# Patient Record
Sex: FEMALE | Race: BLACK OR AFRICAN AMERICAN | NOT HISPANIC OR LATINO | Employment: UNEMPLOYED | URBAN - METROPOLITAN AREA
[De-identification: names, ages, dates, MRNs, and addresses within clinical notes are randomized per-mention and may not be internally consistent; named-entity substitution may affect disease eponyms.]

---

## 2017-04-13 ENCOUNTER — HISTORICAL (OUTPATIENT)
Dept: ADMINISTRATIVE | Facility: HOSPITAL | Age: 62
End: 2017-04-13

## 2017-04-18 ENCOUNTER — HISTORICAL (OUTPATIENT)
Dept: ADMINISTRATIVE | Facility: HOSPITAL | Age: 62
End: 2017-04-18

## 2017-04-28 ENCOUNTER — HISTORICAL (OUTPATIENT)
Dept: ADMINISTRATIVE | Facility: HOSPITAL | Age: 62
End: 2017-04-28

## 2017-04-30 ENCOUNTER — HISTORICAL (OUTPATIENT)
Dept: ADMINISTRATIVE | Facility: HOSPITAL | Age: 62
End: 2017-04-30

## 2017-07-06 ENCOUNTER — HISTORICAL (OUTPATIENT)
Dept: ADMINISTRATIVE | Facility: HOSPITAL | Age: 62
End: 2017-07-06

## 2017-07-06 LAB — TSH SERPL-ACNC: 1.62 MIU/ML (ref 0.36–3.74)

## 2017-10-23 ENCOUNTER — HISTORICAL (OUTPATIENT)
Dept: ADMINISTRATIVE | Facility: HOSPITAL | Age: 62
End: 2017-10-23

## 2017-10-23 LAB
ABS NEUT (OLG): 2.54 X10(3)/MCL (ref 2.1–9.2)
ALBUMIN SERPL-MCNC: 2.8 GM/DL (ref 3.4–5)
ALBUMIN/GLOB SERPL: 0.8 RATIO (ref 1.1–2)
ALP SERPL-CCNC: 77 UNIT/L (ref 38–126)
ALT SERPL-CCNC: 14 UNIT/L (ref 12–78)
AST SERPL-CCNC: 14 UNIT/L (ref 15–37)
BASOPHILS # BLD AUTO: 0 X10(3)/MCL (ref 0–0.2)
BASOPHILS NFR BLD AUTO: 1 %
BILIRUB SERPL-MCNC: 0.3 MG/DL (ref 0.2–1)
BILIRUBIN DIRECT+TOT PNL SERPL-MCNC: 0.1 MG/DL (ref 0–0.5)
BILIRUBIN DIRECT+TOT PNL SERPL-MCNC: 0.2 MG/DL (ref 0–0.8)
BUN SERPL-MCNC: 15 MG/DL (ref 7–18)
CALCIUM SERPL-MCNC: 8.8 MG/DL (ref 8.5–10.1)
CHLORIDE SERPL-SCNC: 108 MMOL/L (ref 98–107)
CO2 SERPL-SCNC: 31 MMOL/L (ref 21–32)
CREAT SERPL-MCNC: 0.49 MG/DL (ref 0.55–1.02)
EOSINOPHIL # BLD AUTO: 0.5 X10(3)/MCL (ref 0–0.9)
EOSINOPHIL NFR BLD AUTO: 9 %
ERYTHROCYTE [DISTWIDTH] IN BLOOD BY AUTOMATED COUNT: 13.9 % (ref 11.5–17)
GLOBULIN SER-MCNC: 3.5 GM/DL (ref 2.4–3.5)
GLUCOSE SERPL-MCNC: 81 MG/DL (ref 74–106)
HCT VFR BLD AUTO: 35.3 % (ref 37–47)
HGB BLD-MCNC: 11.3 GM/DL (ref 12–16)
LYMPHOCYTES # BLD AUTO: 2 X10(3)/MCL (ref 0.6–4.6)
LYMPHOCYTES NFR BLD AUTO: 36 %
MCH RBC QN AUTO: 26 PG (ref 27–31)
MCHC RBC AUTO-ENTMCNC: 32 GM/DL (ref 33–36)
MCV RBC AUTO: 81.3 FL (ref 80–94)
MONOCYTES # BLD AUTO: 0.5 X10(3)/MCL (ref 0.1–1.3)
MONOCYTES NFR BLD AUTO: 9 %
NEUTROPHILS # BLD AUTO: 2.54 X10(3)/MCL (ref 1.4–7.9)
NEUTROPHILS NFR BLD AUTO: 45 %
PLATELET # BLD AUTO: 157 X10(3)/MCL (ref 130–400)
PMV BLD AUTO: 11.7 FL (ref 9.4–12.4)
POTASSIUM SERPL-SCNC: 4.2 MMOL/L (ref 3.5–5.1)
PROT SERPL-MCNC: 6.3 GM/DL (ref 6.4–8.2)
RBC # BLD AUTO: 4.34 X10(6)/MCL (ref 4.2–5.4)
SODIUM SERPL-SCNC: 144 MMOL/L (ref 136–145)
WBC # SPEC AUTO: 5.6 X10(3)/MCL (ref 4.5–11.5)

## 2017-10-30 ENCOUNTER — HISTORICAL (OUTPATIENT)
Dept: ADMINISTRATIVE | Facility: HOSPITAL | Age: 62
End: 2017-10-30

## 2017-10-30 LAB — PREALB SERPL-MCNC: 19.9 MG/DL (ref 18–38)

## 2018-02-14 ENCOUNTER — HISTORICAL (OUTPATIENT)
Dept: ADMINISTRATIVE | Facility: HOSPITAL | Age: 63
End: 2018-02-14

## 2018-02-14 LAB
APPEARANCE, UA: ABNORMAL
BACTERIA SPEC CULT: ABNORMAL /HPF
BILIRUB UR QL STRIP: NEGATIVE
COLOR UR: YELLOW
GLUCOSE (UA): NEGATIVE
HGB UR QL STRIP: ABNORMAL
KETONES UR QL STRIP: NEGATIVE
LEUKOCYTE ESTERASE UR QL STRIP: ABNORMAL
NITRITE UR QL STRIP: NEGATIVE
PH UR STRIP: 8 [PH] (ref 5–9)
PROT UR QL STRIP: ABNORMAL
RBC #/AREA URNS HPF: >900 /HPF (ref 0–2)
SP GR UR STRIP: 1.01 (ref 1–1.03)
SQUAMOUS EPITHELIAL, UA: ABNORMAL
UA WBC MAN: ABNORMAL /HPF
UROBILINOGEN UR STRIP-ACNC: 0.2
WBC #/AREA URNS HPF: 47 /HPF (ref 0–3)

## 2018-02-15 LAB — FINAL CULTURE: NORMAL

## 2018-03-01 ENCOUNTER — HISTORICAL (OUTPATIENT)
Dept: ADMINISTRATIVE | Facility: HOSPITAL | Age: 63
End: 2018-03-01

## 2018-03-01 LAB
APPEARANCE, UA: ABNORMAL
BACTERIA SPEC CULT: ABNORMAL /HPF
BILIRUB UR QL STRIP: NEGATIVE
COLOR UR: YELLOW
GLUCOSE (UA): NEGATIVE
HGB UR QL STRIP: ABNORMAL
KETONES UR QL STRIP: NEGATIVE
LEUKOCYTE ESTERASE UR QL STRIP: ABNORMAL
NITRITE UR QL STRIP: NEGATIVE
PH UR STRIP: 7 [PH] (ref 5–9)
PROT UR QL STRIP: NEGATIVE
RBC #/AREA URNS HPF: >200 /HPF
SP GR UR STRIP: 1.01 (ref 1–1.03)
SQUAMOUS EPITHELIAL, UA: ABNORMAL
UA WBC MAN: ABNORMAL /HPF
UROBILINOGEN UR STRIP-ACNC: 0.2

## 2018-03-03 LAB — FINAL CULTURE: NORMAL

## 2018-04-09 ENCOUNTER — HISTORICAL (OUTPATIENT)
Dept: ADMINISTRATIVE | Facility: HOSPITAL | Age: 63
End: 2018-04-09

## 2018-04-09 LAB
HEMOCCULT SP1 STL QL: NEGATIVE
HEMOCCULT SP2 STL QL: NEGATIVE

## 2018-04-10 LAB
COLOR STL: NORMAL
CONSISTENCY STL: NORMAL
HEMOCCULT SP1 STL QL: NEGATIVE

## 2018-04-11 ENCOUNTER — HISTORICAL (OUTPATIENT)
Dept: ADMINISTRATIVE | Facility: HOSPITAL | Age: 63
End: 2018-04-11

## 2018-04-19 ENCOUNTER — HISTORICAL (OUTPATIENT)
Dept: ADMINISTRATIVE | Facility: HOSPITAL | Age: 63
End: 2018-04-19

## 2018-04-19 LAB
ABS NEUT (OLG): 2.37 X10(3)/MCL (ref 2.1–9.2)
ALBUMIN SERPL-MCNC: 3.1 GM/DL (ref 3.4–5)
ALBUMIN/GLOB SERPL: 1 {RATIO}
ALP SERPL-CCNC: 64 UNIT/L (ref 38–126)
ALT SERPL-CCNC: 14 UNIT/L (ref 12–78)
AST SERPL-CCNC: 13 UNIT/L (ref 15–37)
BASOPHILS # BLD AUTO: 0 X10(3)/MCL (ref 0–0.2)
BASOPHILS NFR BLD AUTO: 1 %
BILIRUB SERPL-MCNC: 0.3 MG/DL (ref 0.2–1)
BILIRUBIN DIRECT+TOT PNL SERPL-MCNC: 0.1 MG/DL (ref 0–0.2)
BILIRUBIN DIRECT+TOT PNL SERPL-MCNC: 0.2 MG/DL (ref 0–0.8)
BUN SERPL-MCNC: 22 MG/DL (ref 7–18)
CALCIUM SERPL-MCNC: 9.1 MG/DL (ref 8.5–10.1)
CHLORIDE SERPL-SCNC: 112 MMOL/L (ref 98–107)
CO2 SERPL-SCNC: 30 MMOL/L (ref 21–32)
CREAT SERPL-MCNC: 0.45 MG/DL (ref 0.55–1.02)
EOSINOPHIL # BLD AUTO: 0.6 X10(3)/MCL (ref 0–0.9)
EOSINOPHIL NFR BLD AUTO: 11 %
ERYTHROCYTE [DISTWIDTH] IN BLOOD BY AUTOMATED COUNT: 14.4 % (ref 11.5–17)
GLOBULIN SER-MCNC: 3 GM/DL (ref 2.4–3.5)
GLUCOSE SERPL-MCNC: 81 MG/DL (ref 74–106)
HCT VFR BLD AUTO: 33.2 % (ref 37–47)
HGB BLD-MCNC: 10.4 GM/DL (ref 12–16)
LYMPHOCYTES # BLD AUTO: 1.8 X10(3)/MCL (ref 0.6–4.6)
LYMPHOCYTES NFR BLD AUTO: 34 %
MCH RBC QN AUTO: 25.6 PG (ref 27–31)
MCHC RBC AUTO-ENTMCNC: 31.3 GM/DL (ref 33–36)
MCV RBC AUTO: 81.6 FL (ref 80–94)
MONOCYTES # BLD AUTO: 0.5 X10(3)/MCL (ref 0.1–1.3)
MONOCYTES NFR BLD AUTO: 9 %
NEUTROPHILS # BLD AUTO: 2.37 X10(3)/MCL (ref 2.1–9.2)
NEUTROPHILS NFR BLD AUTO: 45 %
PLATELET # BLD AUTO: 183 X10(3)/MCL (ref 130–400)
PMV BLD AUTO: 12.2 FL (ref 9.4–12.4)
POTASSIUM SERPL-SCNC: 4 MMOL/L (ref 3.5–5.1)
PROT SERPL-MCNC: 6.1 GM/DL (ref 6.4–8.2)
RBC # BLD AUTO: 4.07 X10(6)/MCL (ref 4.2–5.4)
SODIUM SERPL-SCNC: 146 MMOL/L (ref 136–145)
WBC # SPEC AUTO: 5.3 X10(3)/MCL (ref 4.5–11.5)

## 2018-07-09 ENCOUNTER — HISTORICAL (OUTPATIENT)
Dept: ADMINISTRATIVE | Facility: HOSPITAL | Age: 63
End: 2018-07-09

## 2018-07-09 LAB — TSH SERPL-ACNC: 2.47 MIU/L (ref 0.36–3.74)

## 2018-10-22 ENCOUNTER — HISTORICAL (OUTPATIENT)
Dept: ADMINISTRATIVE | Facility: HOSPITAL | Age: 63
End: 2018-10-22

## 2018-10-22 LAB
ABS NEUT (OLG): 2.86 X10(3)/MCL (ref 2.1–9.2)
ALBUMIN SERPL-MCNC: 3.2 GM/DL (ref 3.4–5)
ALBUMIN/GLOB SERPL: 1 {RATIO}
ALP SERPL-CCNC: 67 UNIT/L (ref 45–117)
ALT SERPL-CCNC: 14 UNIT/L (ref 13–56)
AST SERPL-CCNC: 14 UNIT/L (ref 15–37)
BASOPHILS # BLD AUTO: 0.03 X10(3)/MCL (ref 0–0.2)
BASOPHILS NFR BLD AUTO: 0.5 % (ref 0–1)
BILIRUB SERPL-MCNC: 0.4 MG/DL (ref 0.2–1)
BILIRUBIN DIRECT+TOT PNL SERPL-MCNC: <0.1 MG/DL (ref 0–0.2)
BILIRUBIN DIRECT+TOT PNL SERPL-MCNC: >0.3 MG/DL (ref 0–1)
BUN SERPL-MCNC: 23 MG/DL (ref 7–18)
CALCIUM SERPL-MCNC: 8.8 MG/DL (ref 8.5–10.1)
CHLORIDE SERPL-SCNC: 107 MMOL/L (ref 98–107)
CO2 SERPL-SCNC: 31 MMOL/L (ref 21–32)
CREAT SERPL-MCNC: 0.5 MG/DL (ref 0.55–1.02)
EOSINOPHIL # BLD AUTO: 0.51 X10(3)/MCL (ref 0–0.9)
EOSINOPHIL NFR BLD AUTO: 8.8 % (ref 0–6.4)
ERYTHROCYTE [DISTWIDTH] IN BLOOD BY AUTOMATED COUNT: 13.3 % (ref 11.5–17)
GLOBULIN SER-MCNC: 3 GM/DL (ref 2–4)
GLUCOSE SERPL-MCNC: 84 MG/DL (ref 74–106)
HCT VFR BLD AUTO: 33.5 % (ref 37–47)
HGB BLD-MCNC: 10.6 GM/DL (ref 12–16)
IMM GRANULOCYTES # BLD AUTO: 0.01 10*3/UL (ref 0–0.02)
IMM GRANULOCYTES NFR BLD AUTO: 0.2 % (ref 0–0.43)
LYMPHOCYTES # BLD AUTO: 1.9 X10(3)/MCL (ref 0.6–4.6)
LYMPHOCYTES NFR BLD AUTO: 32.7 % (ref 16–44)
MCH RBC QN AUTO: 26.2 PG (ref 27–31)
MCHC RBC AUTO-ENTMCNC: 31.6 GM/DL (ref 33–36)
MCV RBC AUTO: 82.7 FL (ref 80–94)
MONOCYTES # BLD AUTO: 0.5 X10(3)/MCL (ref 0.1–1.3)
MONOCYTES NFR BLD AUTO: 8.6 % (ref 4–12.1)
NEUTROPHILS # BLD AUTO: 2.86 X10(3)/MCL (ref 2.1–9.2)
NEUTROPHILS NFR BLD AUTO: 49.2 % (ref 43–73)
NRBC BLD AUTO-RTO: 0 % (ref 0–0.2)
PLATELET # BLD AUTO: 140 X10(3)/MCL (ref 130–400)
PMV BLD AUTO: 12.6 FL (ref 7.4–10.4)
POTASSIUM SERPL-SCNC: 4.2 MMOL/L (ref 3.5–5.1)
PROT SERPL-MCNC: 6.2 GM/DL (ref 6.4–8.2)
RBC # BLD AUTO: 4.05 X10(6)/MCL (ref 4.2–5.4)
SODIUM SERPL-SCNC: 144 MMOL/L (ref 136–145)
WBC # SPEC AUTO: 5.8 X10(3)/MCL (ref 4.5–11.5)

## 2019-04-03 ENCOUNTER — HISTORICAL (OUTPATIENT)
Dept: ADMINISTRATIVE | Facility: HOSPITAL | Age: 64
End: 2019-04-03

## 2019-04-03 LAB — HEMOCCULT SP1 STL QL: NEGATIVE

## 2019-04-14 LAB — HEMOCCULT SP2 STL QL: NEGATIVE

## 2019-04-15 ENCOUNTER — HISTORICAL (OUTPATIENT)
Dept: ADMINISTRATIVE | Facility: HOSPITAL | Age: 64
End: 2019-04-15

## 2019-04-18 ENCOUNTER — HISTORICAL (OUTPATIENT)
Dept: ADMINISTRATIVE | Facility: HOSPITAL | Age: 64
End: 2019-04-18

## 2019-04-18 LAB
ABS NEUT (OLG): 2.34 X10(3)/MCL (ref 2.1–9.2)
ALBUMIN SERPL-MCNC: 3.3 GM/DL (ref 3.4–5)
ALBUMIN/GLOB SERPL: 0.8 RATIO (ref 1–2)
ALP SERPL-CCNC: 64 UNIT/L (ref 45–117)
ALT SERPL-CCNC: 15 UNIT/L (ref 13–56)
AST SERPL-CCNC: 20 UNIT/L (ref 15–37)
BASOPHILS # BLD AUTO: 0.03 X10(3)/MCL (ref 0–0.2)
BASOPHILS NFR BLD AUTO: 0.6 % (ref 0–1)
BILIRUB SERPL-MCNC: 0.3 MG/DL (ref 0.2–1)
BILIRUBIN DIRECT+TOT PNL SERPL-MCNC: <0.1 MG/DL (ref 0–0.2)
BILIRUBIN DIRECT+TOT PNL SERPL-MCNC: >0.2 MG/DL (ref 0–1)
BUN SERPL-MCNC: 21 MG/DL (ref 7–18)
CALCIUM SERPL-MCNC: 9 MG/DL (ref 8.5–10.1)
CHLORIDE SERPL-SCNC: 106 MMOL/L (ref 98–107)
CO2 SERPL-SCNC: 29 MMOL/L (ref 21–32)
CREAT SERPL-MCNC: 0.68 MG/DL (ref 0.55–1.02)
EOSINOPHIL # BLD AUTO: 0.44 X10(3)/MCL (ref 0–0.9)
EOSINOPHIL NFR BLD AUTO: 9.3 % (ref 0–6.4)
ERYTHROCYTE [DISTWIDTH] IN BLOOD BY AUTOMATED COUNT: 13.2 % (ref 11.5–17)
GLOBULIN SER-MCNC: 4 GM/DL (ref 2–4)
GLUCOSE SERPL-MCNC: 73 MG/DL (ref 74–106)
HCT VFR BLD AUTO: 34.9 % (ref 37–47)
HGB BLD-MCNC: 11.1 GM/DL (ref 12–16)
IMM GRANULOCYTES # BLD AUTO: 0.01 10*3/UL (ref 0–0.02)
IMM GRANULOCYTES NFR BLD AUTO: 0.2 % (ref 0–0.43)
LYMPHOCYTES # BLD AUTO: 1.52 X10(3)/MCL (ref 0.6–4.6)
LYMPHOCYTES NFR BLD AUTO: 32.2 % (ref 16–44)
MCH RBC QN AUTO: 26.1 PG (ref 27–31)
MCHC RBC AUTO-ENTMCNC: 31.8 GM/DL (ref 33–36)
MCV RBC AUTO: 82.1 FL (ref 80–94)
MONOCYTES # BLD AUTO: 0.38 X10(3)/MCL (ref 0.1–1.3)
MONOCYTES NFR BLD AUTO: 8.1 % (ref 4–12.1)
NEUTROPHILS # BLD AUTO: 2.34 X10(3)/MCL (ref 2.1–9.2)
NEUTROPHILS NFR BLD AUTO: 49.6 % (ref 43–73)
NRBC BLD AUTO-RTO: 0 % (ref 0–0.2)
PLATELET # BLD AUTO: 155 X10(3)/MCL (ref 130–400)
PMV BLD AUTO: 12.6 FL (ref 7.4–10.4)
POTASSIUM SERPL-SCNC: 4 MMOL/L (ref 3.5–5.1)
PROT SERPL-MCNC: 6.8 GM/DL (ref 6.4–8.2)
RBC # BLD AUTO: 4.25 X10(6)/MCL (ref 4.2–5.4)
SODIUM SERPL-SCNC: 144 MMOL/L (ref 136–145)
WBC # SPEC AUTO: 4.7 X10(3)/MCL (ref 4.5–11.5)

## 2019-07-08 ENCOUNTER — HISTORICAL (OUTPATIENT)
Dept: ADMINISTRATIVE | Facility: HOSPITAL | Age: 64
End: 2019-07-08

## 2019-07-08 LAB — TSH SERPL-ACNC: 1.47 MIU/ML (ref 0.36–3.74)

## 2019-08-01 ENCOUNTER — HISTORICAL (OUTPATIENT)
Dept: ADMINISTRATIVE | Facility: HOSPITAL | Age: 64
End: 2019-08-01

## 2019-08-01 LAB
ABS NEUT (OLG): 4.56 X10(3)/MCL (ref 2.1–9.2)
ALBUMIN SERPL-MCNC: 2.8 GM/DL (ref 3.4–5)
ALBUMIN/GLOB SERPL: 0.7 {RATIO}
ALP SERPL-CCNC: 59 UNIT/L (ref 45–117)
ALT SERPL-CCNC: 22 UNIT/L (ref 13–56)
AST SERPL-CCNC: 28 UNIT/L (ref 15–37)
BASOPHILS # BLD AUTO: 0.04 X10(3)/MCL (ref 0–0.2)
BASOPHILS NFR BLD AUTO: 0.6 % (ref 0–1)
BILIRUB SERPL-MCNC: 0.5 MG/DL (ref 0.2–1)
BILIRUBIN DIRECT+TOT PNL SERPL-MCNC: 0.14 MG/DL (ref 0–0.2)
BILIRUBIN DIRECT+TOT PNL SERPL-MCNC: 0.36 MG/DL (ref 0–1)
BUN SERPL-MCNC: 16 MG/DL (ref 7–18)
CALCIUM SERPL-MCNC: 8.8 MG/DL (ref 8.5–10.1)
CHLORIDE SERPL-SCNC: 108 MMOL/L (ref 98–107)
CO2 SERPL-SCNC: 32 MMOL/L (ref 21–32)
CREAT SERPL-MCNC: 0.63 MG/DL (ref 0.55–1.02)
EOSINOPHIL # BLD AUTO: 0.23 X10(3)/MCL (ref 0–0.9)
EOSINOPHIL NFR BLD AUTO: 3.4 % (ref 0–6.4)
ERYTHROCYTE [DISTWIDTH] IN BLOOD BY AUTOMATED COUNT: 13.4 % (ref 11.5–17)
GLOBULIN SER-MCNC: 4 GM/DL (ref 2–4)
GLUCOSE SERPL-MCNC: 119 MG/DL (ref 74–106)
HCT VFR BLD AUTO: 29.9 % (ref 37–47)
HGB BLD-MCNC: 9.4 GM/DL (ref 12–16)
IMM GRANULOCYTES # BLD AUTO: 0.01 10*3/UL (ref 0–0.02)
IMM GRANULOCYTES NFR BLD AUTO: 0.1 % (ref 0–0.43)
LYMPHOCYTES # BLD AUTO: 1.4 X10(3)/MCL (ref 0.6–4.6)
LYMPHOCYTES NFR BLD AUTO: 20.7 % (ref 16–44)
MCH RBC QN AUTO: 25.8 PG (ref 27–31)
MCHC RBC AUTO-ENTMCNC: 31.4 GM/DL (ref 33–36)
MCV RBC AUTO: 82.1 FL (ref 80–94)
MONOCYTES # BLD AUTO: 0.52 X10(3)/MCL (ref 0.1–1.3)
MONOCYTES NFR BLD AUTO: 7.7 % (ref 4–12.1)
NEUTROPHILS # BLD AUTO: 4.56 X10(3)/MCL (ref 2.1–9.2)
NEUTROPHILS NFR BLD AUTO: 67.5 % (ref 43–73)
NRBC BLD AUTO-RTO: 0 % (ref 0–0.2)
PLATELET # BLD AUTO: 193 X10(3)/MCL (ref 130–400)
PMV BLD AUTO: 11.7 FL (ref 7.4–10.4)
POTASSIUM SERPL-SCNC: 3.6 MMOL/L (ref 3.5–5.1)
PROT SERPL-MCNC: 6.4 GM/DL (ref 6.4–8.2)
RBC # BLD AUTO: 3.64 X10(6)/MCL (ref 4.2–5.4)
SODIUM SERPL-SCNC: 143 MMOL/L (ref 136–145)
WBC # SPEC AUTO: 6.8 X10(3)/MCL (ref 4.5–11.5)

## 2019-08-08 ENCOUNTER — HISTORICAL (OUTPATIENT)
Dept: ADMINISTRATIVE | Facility: HOSPITAL | Age: 64
End: 2019-08-08

## 2019-08-08 LAB
ABS NEUT (OLG): 4.92 X10(3)/MCL (ref 2.1–9.2)
BASOPHILS # BLD AUTO: 0.02 X10(3)/MCL (ref 0–0.2)
BASOPHILS NFR BLD AUTO: 0.3 % (ref 0–1)
EOSINOPHIL # BLD AUTO: 0.41 X10(3)/MCL (ref 0–0.9)
EOSINOPHIL NFR BLD AUTO: 5.5 % (ref 0–6.4)
ERYTHROCYTE [DISTWIDTH] IN BLOOD BY AUTOMATED COUNT: 13.2 % (ref 11.5–17)
HCT VFR BLD AUTO: 29.7 % (ref 37–47)
HGB BLD-MCNC: 9.4 GM/DL (ref 12–16)
IMM GRANULOCYTES # BLD AUTO: 0.02 10*3/UL (ref 0–0.02)
IMM GRANULOCYTES NFR BLD AUTO: 0.3 % (ref 0–0.43)
LYMPHOCYTES # BLD AUTO: 1.42 X10(3)/MCL (ref 0.6–4.6)
LYMPHOCYTES NFR BLD AUTO: 19 % (ref 16–44)
MCH RBC QN AUTO: 25.8 PG (ref 27–31)
MCHC RBC AUTO-ENTMCNC: 31.6 GM/DL (ref 33–36)
MCV RBC AUTO: 81.4 FL (ref 80–94)
MONOCYTES # BLD AUTO: 0.7 X10(3)/MCL (ref 0.1–1.3)
MONOCYTES NFR BLD AUTO: 9.3 % (ref 4–12.1)
NEUTROPHILS # BLD AUTO: 4.92 X10(3)/MCL (ref 2.1–9.2)
NEUTROPHILS NFR BLD AUTO: 65.6 % (ref 43–73)
NRBC BLD AUTO-RTO: 0 % (ref 0–0.2)
PLATELET # BLD AUTO: 235 X10(3)/MCL (ref 130–400)
PMV BLD AUTO: 11.3 FL (ref 7.4–10.4)
RBC # BLD AUTO: 3.65 X10(6)/MCL (ref 4.2–5.4)
WBC # SPEC AUTO: 7.5 X10(3)/MCL (ref 4.5–11.5)

## 2019-08-15 ENCOUNTER — HISTORICAL (OUTPATIENT)
Dept: ADMINISTRATIVE | Facility: HOSPITAL | Age: 64
End: 2019-08-15

## 2019-08-15 LAB
RET# (OHS): 0.06 X10^6/ML (ref 0.02–0.08)
RETICULOCYTE COUNT AUTOMATED (OLG): 1.5 % (ref 1.1–2.1)

## 2019-08-19 ENCOUNTER — HISTORICAL (OUTPATIENT)
Dept: ADMINISTRATIVE | Facility: HOSPITAL | Age: 64
End: 2019-08-19

## 2019-08-19 LAB — HEMOCCULT SP1 STL QL: NEGATIVE

## 2019-10-21 ENCOUNTER — HISTORICAL (OUTPATIENT)
Dept: ADMINISTRATIVE | Facility: HOSPITAL | Age: 64
End: 2019-10-21

## 2019-10-21 LAB
ABS NEUT (OLG): 2.7 X10(3)/MCL (ref 2.1–9.2)
ALBUMIN SERPL-MCNC: 3.1 GM/DL (ref 3.4–5)
ALBUMIN/GLOB SERPL: 1 RATIO (ref 1–2)
ALP SERPL-CCNC: 96 UNIT/L (ref 45–117)
ALT SERPL-CCNC: 12 UNIT/L (ref 13–56)
AST SERPL-CCNC: 11 UNIT/L (ref 15–37)
BASOPHILS # BLD AUTO: 0.04 X10(3)/MCL (ref 0–0.2)
BASOPHILS NFR BLD AUTO: 0.7 % (ref 0–1)
BILIRUB SERPL-MCNC: 0.3 MG/DL (ref 0.2–1)
BILIRUBIN DIRECT+TOT PNL SERPL-MCNC: <0.1 MG/DL (ref 0–0.2)
BILIRUBIN DIRECT+TOT PNL SERPL-MCNC: >0.2 MG/DL (ref 0–1)
BUN SERPL-MCNC: 10 MG/DL (ref 7–18)
CALCIUM SERPL-MCNC: 9.1 MG/DL (ref 8.5–10.1)
CHLORIDE SERPL-SCNC: 108 MMOL/L (ref 98–107)
CO2 SERPL-SCNC: 30 MMOL/L (ref 21–32)
CREAT SERPL-MCNC: 0.45 MG/DL (ref 0.55–1.02)
EOSINOPHIL # BLD AUTO: 0.49 X10(3)/MCL (ref 0–0.9)
EOSINOPHIL NFR BLD AUTO: 8.9 % (ref 0–6.4)
ERYTHROCYTE [DISTWIDTH] IN BLOOD BY AUTOMATED COUNT: 14 % (ref 11.5–17)
GLOBULIN SER-MCNC: 3 GM/DL (ref 2–4)
GLUCOSE SERPL-MCNC: 80 MG/DL (ref 74–106)
HCT VFR BLD AUTO: 32.2 % (ref 37–47)
HGB BLD-MCNC: 10 GM/DL (ref 12–16)
IMM GRANULOCYTES # BLD AUTO: 0.02 10*3/UL (ref 0–0.02)
IMM GRANULOCYTES NFR BLD AUTO: 0.4 % (ref 0–0.43)
LYMPHOCYTES # BLD AUTO: 1.7 X10(3)/MCL (ref 0.6–4.6)
LYMPHOCYTES NFR BLD AUTO: 30.8 % (ref 16–44)
MCH RBC QN AUTO: 24.6 PG (ref 27–31)
MCHC RBC AUTO-ENTMCNC: 31.1 GM/DL (ref 33–36)
MCV RBC AUTO: 79.1 FL (ref 80–94)
MONOCYTES # BLD AUTO: 0.57 X10(3)/MCL (ref 0.1–1.3)
MONOCYTES NFR BLD AUTO: 10.3 % (ref 4–12.1)
NEUTROPHILS # BLD AUTO: 2.7 X10(3)/MCL (ref 2.1–9.2)
NEUTROPHILS NFR BLD AUTO: 48.9 % (ref 43–73)
NRBC BLD AUTO-RTO: 0 % (ref 0–0.2)
PLATELET # BLD AUTO: 145 X10(3)/MCL (ref 130–400)
PMV BLD AUTO: 12.1 FL (ref 7.4–10.4)
POTASSIUM SERPL-SCNC: 4.4 MMOL/L (ref 3.5–5.1)
PROT SERPL-MCNC: 6 GM/DL (ref 6.4–8.2)
RBC # BLD AUTO: 4.07 X10(6)/MCL (ref 4.2–5.4)
SODIUM SERPL-SCNC: 142 MMOL/L (ref 136–145)
WBC # SPEC AUTO: 5.5 X10(3)/MCL (ref 4.5–11.5)

## 2020-04-01 ENCOUNTER — HISTORICAL (OUTPATIENT)
Dept: ADMINISTRATIVE | Facility: HOSPITAL | Age: 65
End: 2020-04-01

## 2020-04-01 LAB — HEMOCCULT SP1 STL QL: NEGATIVE

## 2020-04-04 LAB
COLOR STL: NORMAL
CONSISTENCY STL: NORMAL
HEMOCCULT SP1 STL QL: NEGATIVE

## 2020-04-06 ENCOUNTER — HISTORICAL (OUTPATIENT)
Dept: ADMINISTRATIVE | Facility: HOSPITAL | Age: 65
End: 2020-04-06

## 2020-04-20 ENCOUNTER — HISTORICAL (OUTPATIENT)
Dept: ADMINISTRATIVE | Facility: HOSPITAL | Age: 65
End: 2020-04-20

## 2020-04-20 LAB
ABS NEUT (OLG): 1.9 X10(3)/MCL (ref 2.1–9.2)
ALBUMIN SERPL-MCNC: 3.3 GM/DL (ref 3.4–4.8)
ALBUMIN/GLOB SERPL: 1.1 RATIO (ref 1.1–2)
ALP SERPL-CCNC: 69 UNIT/L (ref 40–150)
ALT SERPL-CCNC: 17 UNIT/L (ref 0–55)
AST SERPL-CCNC: 20 UNIT/L (ref 5–34)
BASOPHILS # BLD AUTO: 0.03 X10(3)/MCL (ref 0–0.2)
BASOPHILS NFR BLD AUTO: 0.7 % (ref 0–1)
BILIRUB SERPL-MCNC: 0.3 MG/DL (ref 0.2–1.2)
BILIRUBIN DIRECT+TOT PNL SERPL-MCNC: 0.1 MG/DL (ref 0–0.5)
BILIRUBIN DIRECT+TOT PNL SERPL-MCNC: 0.2 MG/DL (ref 0–0.8)
BUN SERPL-MCNC: 27.1 MG/DL (ref 9.8–20.1)
CALCIUM SERPL-MCNC: 9.3 MG/DL (ref 8.4–10.2)
CHLORIDE SERPL-SCNC: 106 MMOL/L (ref 98–107)
CO2 SERPL-SCNC: 29 MMOL/L (ref 23–31)
CREAT SERPL-MCNC: 0.79 MG/DL (ref 0.57–1.11)
EOSINOPHIL # BLD AUTO: 0.32 X10(3)/MCL (ref 0–0.9)
EOSINOPHIL NFR BLD AUTO: 7 % (ref 0–6.4)
ERYTHROCYTE [DISTWIDTH] IN BLOOD BY AUTOMATED COUNT: 16.9 % (ref 11.5–17)
GLOBULIN SER-MCNC: 2.9 GM/DL (ref 2.4–3.5)
GLUCOSE SERPL-MCNC: 77 MG/DL (ref 82–115)
HCT VFR BLD AUTO: 34.6 % (ref 37–47)
HGB BLD-MCNC: 10.7 GM/DL (ref 12–16)
IMM GRANULOCYTES # BLD AUTO: 0.01 10*3/UL (ref 0–0.02)
IMM GRANULOCYTES NFR BLD AUTO: 0.2 % (ref 0–0.43)
LYMPHOCYTES # BLD AUTO: 1.9 X10(3)/MCL (ref 0.6–4.6)
LYMPHOCYTES NFR BLD AUTO: 41.7 % (ref 16–44)
MCH RBC QN AUTO: 24.3 PG (ref 27–31)
MCHC RBC AUTO-ENTMCNC: 30.9 GM/DL (ref 33–36)
MCV RBC AUTO: 78.6 FL (ref 80–94)
MONOCYTES # BLD AUTO: 0.4 X10(3)/MCL (ref 0.1–1.3)
MONOCYTES NFR BLD AUTO: 8.8 % (ref 4–12.1)
NEUTROPHILS # BLD AUTO: 1.9 X10(3)/MCL (ref 2.1–9.2)
NEUTROPHILS NFR BLD AUTO: 41.6 % (ref 43–73)
NRBC BLD AUTO-RTO: 0 % (ref 0–0.2)
PLATELET # BLD AUTO: 146 X10(3)/MCL (ref 130–400)
PMV BLD AUTO: 11.6 FL (ref 7.4–10.4)
POTASSIUM SERPL-SCNC: 4.1 MMOL/L (ref 3.5–5.1)
PROT SERPL-MCNC: 6.2 GM/DL (ref 5.8–7.6)
RBC # BLD AUTO: 4.4 X10(6)/MCL (ref 4.2–5.4)
SODIUM SERPL-SCNC: 142 MMOL/L (ref 136–145)
WBC # SPEC AUTO: 4.6 X10(3)/MCL (ref 4.5–11.5)

## 2020-04-21 ENCOUNTER — HISTORICAL (OUTPATIENT)
Dept: ADMINISTRATIVE | Facility: HOSPITAL | Age: 65
End: 2020-04-21

## 2020-04-21 LAB — HEMOCCULT SP1 STL QL: NEGATIVE

## 2020-05-28 ENCOUNTER — HISTORICAL (OUTPATIENT)
Dept: ADMINISTRATIVE | Facility: HOSPITAL | Age: 65
End: 2020-05-28

## 2020-05-28 LAB — TSH SERPL-ACNC: 1.67 UIU/ML (ref 0.35–4.94)

## 2020-06-04 ENCOUNTER — HISTORICAL (OUTPATIENT)
Dept: ADMINISTRATIVE | Facility: HOSPITAL | Age: 65
End: 2020-06-04

## 2020-06-04 LAB
APPEARANCE, UA: ABNORMAL
BACTERIA SPEC CULT: ABNORMAL /HPF
BILIRUB UR QL STRIP: NEGATIVE
COLOR UR: YELLOW
GLUCOSE (UA): NEGATIVE
HGB UR QL STRIP: ABNORMAL
KETONES UR QL STRIP: NEGATIVE
LEUKOCYTE ESTERASE UR QL STRIP: ABNORMAL
NITRITE UR QL STRIP: NEGATIVE
PH UR STRIP: 8.5 [PH] (ref 5–7)
PROT UR QL STRIP: ABNORMAL
RBC #/AREA URNS HPF: ABNORMAL /HPF
SP GR UR STRIP: 1.01 (ref 1–1.03)
SQUAMOUS EPITHELIAL, UA: ABNORMAL /LPF
UROBILINOGEN UR STRIP-ACNC: NEGATIVE
WBC #/AREA URNS HPF: ABNORMAL /HPF

## 2020-07-16 ENCOUNTER — HISTORICAL (OUTPATIENT)
Dept: ADMINISTRATIVE | Facility: HOSPITAL | Age: 65
End: 2020-07-16

## 2020-07-16 LAB — TSH SERPL-ACNC: 1.9 UIU/ML (ref 0.35–4.94)

## 2020-10-19 ENCOUNTER — HISTORICAL (OUTPATIENT)
Dept: ADMINISTRATIVE | Facility: HOSPITAL | Age: 65
End: 2020-10-19

## 2020-10-19 LAB
ABS NEUT (OLG): 2.07 X10(3)/MCL (ref 2.1–9.2)
ALBUMIN SERPL-MCNC: 3.2 GM/DL (ref 3.4–4.8)
ALBUMIN/GLOB SERPL: 1.1 RATIO (ref 1.1–2)
ALP SERPL-CCNC: 72 UNIT/L (ref 40–150)
ALT SERPL-CCNC: 12 UNIT/L (ref 0–55)
AST SERPL-CCNC: 18 UNIT/L (ref 5–34)
BASOPHILS NFR BLD MANUAL: 0 % (ref 0–2)
BILIRUB SERPL-MCNC: 0.3 MG/DL (ref 0.2–1.2)
BILIRUBIN DIRECT+TOT PNL SERPL-MCNC: 0.1 MG/DL (ref 0–0.5)
BILIRUBIN DIRECT+TOT PNL SERPL-MCNC: 0.2 MG/DL (ref 0–0.8)
BUN SERPL-MCNC: 18.5 MG/DL (ref 9.8–20.1)
CALCIUM SERPL-MCNC: 9.6 MG/DL (ref 8.4–10.2)
CHLORIDE SERPL-SCNC: 105 MMOL/L (ref 98–107)
CO2 SERPL-SCNC: 30 MMOL/L (ref 23–31)
CREAT SERPL-MCNC: 0.7 MG/DL (ref 0.57–1.11)
EOSINOPHIL NFR BLD MANUAL: 8 % (ref 0–8)
ERYTHROCYTE [DISTWIDTH] IN BLOOD BY AUTOMATED COUNT: 14.9 % (ref 11.5–17)
GLOBULIN SER-MCNC: 2.8 GM/DL (ref 2.4–3.5)
GLUCOSE SERPL-MCNC: 80 MG/DL (ref 82–115)
GRANULOCYTES NFR BLD MANUAL: 39 % (ref 47–80)
HCT VFR BLD AUTO: 35.2 % (ref 37–47)
HGB BLD-MCNC: 11 GM/DL (ref 12–16)
LYMPHOCYTES NFR BLD MANUAL: 48 % (ref 13–40)
MCH RBC QN AUTO: 24.9 PG (ref 27–31)
MCHC RBC AUTO-ENTMCNC: 31.3 GM/DL (ref 33–36)
MCV RBC AUTO: 79.8 FL (ref 80–94)
MONOCYTES NFR BLD MANUAL: 5 % (ref 2–11)
PLATELET # BLD AUTO: 161 X10(3)/MCL (ref 130–400)
PLATELET # BLD EST: ADEQUATE 10*3/UL
PMV BLD AUTO: 12.3 FL (ref 7.4–10.4)
POTASSIUM SERPL-SCNC: 4.4 MMOL/L (ref 3.5–5.1)
PROT SERPL-MCNC: 6 GM/DL (ref 5.8–7.6)
RBC # BLD AUTO: 4.41 X10(6)/MCL (ref 4.2–5.4)
RBC MORPH BLD: NORMAL
SODIUM SERPL-SCNC: 143 MMOL/L (ref 136–145)
WBC # SPEC AUTO: 4.9 X10(3)/MCL (ref 4.5–11.5)

## 2021-04-19 ENCOUNTER — HISTORICAL (OUTPATIENT)
Dept: ADMINISTRATIVE | Facility: HOSPITAL | Age: 66
End: 2021-04-19

## 2021-04-19 LAB
ABS NEUT (OLG): 2.56 X10(3)/MCL (ref 2.1–9.2)
ALBUMIN SERPL-MCNC: 3.4 GM/DL (ref 3.4–4.8)
ALBUMIN/GLOB SERPL: 1.3 RATIO (ref 1.1–2)
ALP SERPL-CCNC: 56 UNIT/L (ref 40–150)
ALT SERPL-CCNC: 13 UNIT/L (ref 0–55)
AST SERPL-CCNC: 16 UNIT/L (ref 5–34)
BASOPHILS # BLD AUTO: 0.04 X10(3)/MCL (ref 0–0.2)
BASOPHILS NFR BLD AUTO: 0.7 % (ref 0–1)
BILIRUB SERPL-MCNC: 0.4 MG/DL (ref 0.2–1.2)
BILIRUBIN DIRECT+TOT PNL SERPL-MCNC: 0.2 MG/DL (ref 0–0.5)
BILIRUBIN DIRECT+TOT PNL SERPL-MCNC: 0.2 MG/DL (ref 0–0.8)
BUN SERPL-MCNC: 25.1 MG/DL (ref 9.8–20.1)
CALCIUM SERPL-MCNC: 9.2 MG/DL (ref 8.4–10.2)
CHLORIDE SERPL-SCNC: 111 MMOL/L (ref 98–107)
CO2 SERPL-SCNC: 29 MMOL/L (ref 23–31)
CREAT SERPL-MCNC: 0.8 MG/DL (ref 0.57–1.11)
EOSINOPHIL # BLD AUTO: 0.54 X10(3)/MCL (ref 0–0.9)
EOSINOPHIL NFR BLD AUTO: 9.5 % (ref 0–6.4)
ERYTHROCYTE [DISTWIDTH] IN BLOOD BY AUTOMATED COUNT: 13.5 % (ref 11.5–17)
GLOBULIN SER-MCNC: 2.6 GM/DL (ref 2.4–3.5)
GLUCOSE SERPL-MCNC: 76 MG/DL (ref 82–115)
HCT VFR BLD AUTO: 33.8 % (ref 37–47)
HGB BLD-MCNC: 10.5 GM/DL (ref 12–16)
IMM GRANULOCYTES # BLD AUTO: 0.01 10*3/UL (ref 0–0.02)
IMM GRANULOCYTES NFR BLD AUTO: 0.2 % (ref 0–0.43)
LYMPHOCYTES # BLD AUTO: 2 X10(3)/MCL (ref 0.6–4.6)
LYMPHOCYTES NFR BLD AUTO: 35.3 % (ref 16–44)
MCH RBC QN AUTO: 25.9 PG (ref 27–31)
MCHC RBC AUTO-ENTMCNC: 31.1 GM/DL (ref 33–36)
MCV RBC AUTO: 83.3 FL (ref 80–94)
MONOCYTES # BLD AUTO: 0.51 X10(3)/MCL (ref 0.1–1.3)
MONOCYTES NFR BLD AUTO: 9 % (ref 4–12.1)
NEUTROPHILS # BLD AUTO: 2.56 X10(3)/MCL (ref 2.1–9.2)
NEUTROPHILS NFR BLD AUTO: 45.3 % (ref 43–73)
NRBC BLD AUTO-RTO: 0 % (ref 0–0.2)
PLATELET # BLD AUTO: 136 X10(3)/MCL (ref 130–400)
PMV BLD AUTO: 13.6 FL (ref 7.4–10.4)
POTASSIUM SERPL-SCNC: 3.9 MMOL/L (ref 3.5–5.1)
PROT SERPL-MCNC: 6 GM/DL (ref 5.8–7.6)
RBC # BLD AUTO: 4.06 X10(6)/MCL (ref 4.2–5.4)
SODIUM SERPL-SCNC: 145 MMOL/L (ref 136–145)
WBC # SPEC AUTO: 5.7 X10(3)/MCL (ref 4.5–11.5)

## 2021-04-26 ENCOUNTER — HISTORICAL (OUTPATIENT)
Dept: ADMINISTRATIVE | Facility: HOSPITAL | Age: 66
End: 2021-04-26

## 2021-04-26 LAB
COLOR STL: NORMAL
CONSISTENCY STL: NORMAL
HEMOCCULT SP1 STL QL: NEGATIVE

## 2021-04-29 ENCOUNTER — HISTORICAL (OUTPATIENT)
Dept: ADMINISTRATIVE | Facility: HOSPITAL | Age: 66
End: 2021-04-29

## 2021-04-29 LAB — HEMOCCULT SP1 STL QL: NEGATIVE

## 2021-04-30 ENCOUNTER — HISTORICAL (OUTPATIENT)
Dept: ADMINISTRATIVE | Facility: HOSPITAL | Age: 66
End: 2021-04-30

## 2021-04-30 LAB — HEMOCCULT SP1 STL QL: NEGATIVE

## 2021-07-10 ENCOUNTER — HISTORICAL (OUTPATIENT)
Dept: ADMINISTRATIVE | Facility: HOSPITAL | Age: 66
End: 2021-07-10

## 2021-07-10 LAB — TSH SERPL-ACNC: 2.97 UIU/ML (ref 0.35–4.94)

## 2021-07-19 ENCOUNTER — HISTORICAL (OUTPATIENT)
Dept: ADMINISTRATIVE | Facility: HOSPITAL | Age: 66
End: 2021-07-19

## 2021-07-19 LAB — TSH SERPL-ACNC: 0.93 UIU/ML (ref 0.35–4.94)

## 2021-10-18 ENCOUNTER — HISTORICAL (OUTPATIENT)
Dept: ADMINISTRATIVE | Facility: HOSPITAL | Age: 66
End: 2021-10-18

## 2021-10-18 LAB
ABS NEUT (OLG): 3.78 X10(3)/MCL (ref 2.1–9.2)
ALBUMIN SERPL-MCNC: 3 GM/DL (ref 3.4–4.8)
ALBUMIN/GLOB SERPL: 0.9 RATIO (ref 1.1–2)
ALP SERPL-CCNC: 67 UNIT/L (ref 40–150)
ALT SERPL-CCNC: 9 UNIT/L (ref 0–55)
AST SERPL-CCNC: 14 UNIT/L (ref 5–34)
BASOPHILS # BLD AUTO: 0.04 X10(3)/MCL (ref 0–0.2)
BASOPHILS NFR BLD AUTO: 0.6 % (ref 0–1)
BILIRUB SERPL-MCNC: 0.3 MG/DL (ref 0.2–1.2)
BILIRUBIN DIRECT+TOT PNL SERPL-MCNC: 0.1 MG/DL (ref 0–0.5)
BILIRUBIN DIRECT+TOT PNL SERPL-MCNC: 0.2 MG/DL (ref 0–0.8)
BUN SERPL-MCNC: 30.5 MG/DL (ref 9.8–20.1)
CALCIUM SERPL-MCNC: 9.3 MG/DL (ref 8.4–10.2)
CHLORIDE SERPL-SCNC: 107 MMOL/L (ref 98–107)
CO2 SERPL-SCNC: 29 MMOL/L (ref 23–31)
CREAT SERPL-MCNC: 1.1 MG/DL (ref 0.57–1.11)
EOSINOPHIL # BLD AUTO: 0.5 X10(3)/MCL (ref 0–0.9)
EOSINOPHIL NFR BLD AUTO: 7.3 % (ref 0–6.4)
ERYTHROCYTE [DISTWIDTH] IN BLOOD BY AUTOMATED COUNT: 15.4 % (ref 11.5–17)
GLOBULIN SER-MCNC: 3.5 GM/DL (ref 2.4–3.5)
GLUCOSE SERPL-MCNC: 84 MG/DL (ref 82–115)
HCT VFR BLD AUTO: 26.8 % (ref 37–47)
HGB BLD-MCNC: 8.2 GM/DL (ref 12–16)
IMM GRANULOCYTES # BLD AUTO: 0.01 10*3/UL (ref 0–0.02)
IMM GRANULOCYTES NFR BLD AUTO: 0.1 % (ref 0–0.43)
LYMPHOCYTES # BLD AUTO: 1.93 X10(3)/MCL (ref 0.6–4.6)
LYMPHOCYTES NFR BLD AUTO: 28.1 % (ref 16–44)
MCH RBC QN AUTO: 23.2 PG (ref 27–31)
MCHC RBC AUTO-ENTMCNC: 30.6 GM/DL (ref 33–36)
MCV RBC AUTO: 75.9 FL (ref 80–94)
MONOCYTES # BLD AUTO: 0.61 X10(3)/MCL (ref 0.1–1.3)
MONOCYTES NFR BLD AUTO: 8.9 % (ref 4–12.1)
NEUTROPHILS # BLD AUTO: 3.78 X10(3)/MCL (ref 2.1–9.2)
NEUTROPHILS NFR BLD AUTO: 55 % (ref 43–73)
NRBC BLD AUTO-RTO: 0 % (ref 0–0.2)
PLATELET # BLD AUTO: 262 X10(3)/MCL (ref 130–400)
PMV BLD AUTO: 12.3 FL (ref 7.4–10.4)
POTASSIUM SERPL-SCNC: 4.2 MMOL/L (ref 3.5–5.1)
PROT SERPL-MCNC: 6.5 GM/DL (ref 5.8–7.6)
RBC # BLD AUTO: 3.53 X10(6)/MCL (ref 4.2–5.4)
SODIUM SERPL-SCNC: 143 MMOL/L (ref 136–145)
WBC # SPEC AUTO: 6.9 X10(3)/MCL (ref 4.5–11.5)

## 2021-10-19 ENCOUNTER — HISTORICAL (OUTPATIENT)
Dept: ADMINISTRATIVE | Facility: HOSPITAL | Age: 66
End: 2021-10-19

## 2021-10-19 LAB
FERRITIN SERPL-MCNC: 249.19 NG/ML (ref 4.63–204)
IRON SATN MFR SERPL: 11 % (ref 20–50)
IRON SERPL-MCNC: 22 UG/DL (ref 50–170)
TIBC SERPL-MCNC: 176 UG/DL (ref 70–310)
TIBC SERPL-MCNC: 198 UG/DL (ref 250–450)
TRANSFERRIN SERPL-MCNC: 173 MG/DL (ref 173–360)

## 2021-10-20 ENCOUNTER — HISTORICAL (OUTPATIENT)
Dept: ADMINISTRATIVE | Facility: HOSPITAL | Age: 66
End: 2021-10-20

## 2021-10-20 LAB — HEMOCCULT SP1 STL QL: POSITIVE

## 2021-10-29 ENCOUNTER — HISTORICAL (OUTPATIENT)
Dept: ADMINISTRATIVE | Facility: HOSPITAL | Age: 66
End: 2021-10-29

## 2021-10-29 LAB
ABS NEUT (OLG): 6.04 X10(3)/MCL (ref 2.1–9.2)
BASOPHILS # BLD AUTO: 0.05 X10(3)/MCL (ref 0–0.2)
BASOPHILS NFR BLD AUTO: 0.6 % (ref 0–1)
EOSINOPHIL # BLD AUTO: 0.31 X10(3)/MCL (ref 0–0.9)
EOSINOPHIL NFR BLD AUTO: 3.6 % (ref 0–6.4)
ERYTHROCYTE [DISTWIDTH] IN BLOOD BY AUTOMATED COUNT: 15.4 % (ref 11.5–17)
HCT VFR BLD AUTO: 30 % (ref 37–47)
HGB BLD-MCNC: 9 GM/DL (ref 12–16)
IMM GRANULOCYTES # BLD AUTO: 0.03 10*3/UL (ref 0–0.02)
IMM GRANULOCYTES NFR BLD AUTO: 0.3 % (ref 0–0.43)
LYMPHOCYTES # BLD AUTO: 1.49 X10(3)/MCL (ref 0.6–4.6)
LYMPHOCYTES NFR BLD AUTO: 17.3 % (ref 16–44)
MCH RBC QN AUTO: 22.7 PG (ref 27–31)
MCHC RBC AUTO-ENTMCNC: 30 GM/DL (ref 33–36)
MCV RBC AUTO: 75.8 FL (ref 80–94)
MONOCYTES # BLD AUTO: 0.68 X10(3)/MCL (ref 0.1–1.3)
MONOCYTES NFR BLD AUTO: 7.9 % (ref 4–12.1)
NEUTROPHILS # BLD AUTO: 6.04 X10(3)/MCL (ref 2.1–9.2)
NEUTROPHILS NFR BLD AUTO: 70.3 % (ref 43–73)
NRBC BLD AUTO-RTO: 0 % (ref 0–0.2)
PLATELET # BLD AUTO: 271 X10(3)/MCL (ref 130–400)
PMV BLD AUTO: 11.7 FL (ref 7.4–10.4)
RBC # BLD AUTO: 3.96 X10(6)/MCL (ref 4.2–5.4)
RET# (OHS): 0.03 X10^6/ML (ref 0.02–0.08)
RETICULOCYTE COUNT AUTOMATED (OLG): 0.7 % (ref 1.1–2.1)
WBC # SPEC AUTO: 8.6 X10(3)/MCL (ref 4.5–11.5)

## 2021-11-11 ENCOUNTER — HISTORICAL (OUTPATIENT)
Dept: ADMINISTRATIVE | Facility: HOSPITAL | Age: 66
End: 2021-11-11

## 2021-11-11 LAB
ABS NEUT (OLG): 3.67 X10(3)/MCL (ref 2.1–9.2)
ANISOCYTOSIS BLD QL SMEAR: ABNORMAL
BASOPHILS # BLD AUTO: 0.03 X10(3)/MCL (ref 0–0.2)
BASOPHILS NFR BLD AUTO: 0.5 % (ref 0–1)
EOSINOPHIL # BLD AUTO: 0.4 X10(3)/MCL (ref 0–0.9)
EOSINOPHIL NFR BLD AUTO: 6.5 % (ref 0–6.4)
ERYTHROCYTE [DISTWIDTH] IN BLOOD BY AUTOMATED COUNT: 15.9 % (ref 11.5–17)
HCT VFR BLD AUTO: 28.3 % (ref 37–47)
HGB BLD-MCNC: 8.4 GM/DL (ref 12–16)
HYPOCHROMIA BLD QL SMEAR: ABNORMAL
IMM GRANULOCYTES # BLD AUTO: 0.02 10*3/UL (ref 0–0.02)
IMM GRANULOCYTES NFR BLD AUTO: 0.3 % (ref 0–0.43)
LYMPHOCYTES # BLD AUTO: 1.52 X10(3)/MCL (ref 0.6–4.6)
LYMPHOCYTES NFR BLD AUTO: 24.7 % (ref 16–44)
MCH RBC QN AUTO: 22.9 PG (ref 27–31)
MCHC RBC AUTO-ENTMCNC: 29.7 GM/DL (ref 33–36)
MCV RBC AUTO: 77.1 FL (ref 80–94)
MICROCYTES BLD QL SMEAR: ABNORMAL
MONOCYTES # BLD AUTO: 0.51 X10(3)/MCL (ref 0.1–1.3)
MONOCYTES NFR BLD AUTO: 8.3 % (ref 4–12.1)
NEUTROPHILS # BLD AUTO: 3.67 X10(3)/MCL (ref 2.1–9.2)
NEUTROPHILS NFR BLD AUTO: 59.7 % (ref 43–73)
NRBC BLD AUTO-RTO: 0 % (ref 0–0.2)
PLATELET # BLD AUTO: 246 X10(3)/MCL (ref 130–400)
PLATELET # BLD EST: ADEQUATE 10*3/UL
PMV BLD AUTO: 11.7 FL (ref 7.4–10.4)
RBC # BLD AUTO: 3.67 X10(6)/MCL (ref 4.2–5.4)
RBC MORPH BLD: ABNORMAL
WBC # SPEC AUTO: 6.2 X10(3)/MCL (ref 4.5–11.5)

## 2022-01-10 ENCOUNTER — HISTORICAL (OUTPATIENT)
Dept: ADMINISTRATIVE | Facility: HOSPITAL | Age: 67
End: 2022-01-10

## 2022-01-10 LAB
ABS NEUT (OLG): 6.42 X10(3)/MCL (ref 2.1–9.2)
ANISOCYTOSIS BLD QL SMEAR: ABNORMAL
BASOPHILS # BLD AUTO: 0.05 X10(3)/MCL (ref 0–0.2)
BASOPHILS NFR BLD AUTO: 0.6 % (ref 0–1)
EOSINOPHIL # BLD AUTO: 0.3 X10(3)/MCL (ref 0–0.9)
EOSINOPHIL NFR BLD AUTO: 3.3 % (ref 0–6.4)
ERYTHROCYTE [DISTWIDTH] IN BLOOD BY AUTOMATED COUNT: 15.9 % (ref 11.5–17)
FERRITIN SERPL-MCNC: 277.67 NG/ML (ref 4.63–204)
HCT VFR BLD AUTO: 25.9 % (ref 37–47)
HGB BLD-MCNC: 7.8 GM/DL (ref 12–16)
HYPOCHROMIA BLD QL SMEAR: ABNORMAL
IMM GRANULOCYTES # BLD AUTO: 0.03 10*3/UL (ref 0–0.02)
IMM GRANULOCYTES NFR BLD AUTO: 0.3 % (ref 0–0.43)
IRON SATN MFR SERPL: 9 % (ref 20–50)
IRON SERPL-MCNC: 16 UG/DL (ref 50–170)
LYMPHOCYTES # BLD AUTO: 1.4 X10(3)/MCL (ref 0.6–4.6)
LYMPHOCYTES NFR BLD AUTO: 15.5 % (ref 16–44)
MCH RBC QN AUTO: 21.7 PG (ref 27–31)
MCHC RBC AUTO-ENTMCNC: 30.1 GM/DL (ref 33–36)
MCV RBC AUTO: 72.1 FL (ref 80–94)
MICROCYTES BLD QL SMEAR: ABNORMAL
MONOCYTES # BLD AUTO: 0.82 X10(3)/MCL (ref 0.1–1.3)
MONOCYTES NFR BLD AUTO: 9.1 % (ref 4–12.1)
NEUTROPHILS # BLD AUTO: 6.42 X10(3)/MCL (ref 2.1–9.2)
NEUTROPHILS NFR BLD AUTO: 71.2 % (ref 43–73)
NRBC BLD AUTO-RTO: 0 % (ref 0–0.2)
PLATELET # BLD AUTO: 303 X10(3)/MCL (ref 130–400)
PLATELET # BLD EST: ADEQUATE 10*3/UL
PMV BLD AUTO: 11.5 FL (ref 7.4–10.4)
POIKILOCYTOSIS BLD QL SMEAR: SLIGHT
RBC # BLD AUTO: 3.59 X10(6)/MCL (ref 4.2–5.4)
RBC MORPH BLD: ABNORMAL
TIBC SERPL-MCNC: 162 UG/DL (ref 70–310)
TIBC SERPL-MCNC: 178 UG/DL (ref 250–450)
TRANSFERRIN SERPL-MCNC: 162 MG/DL (ref 173–360)
WBC # SPEC AUTO: 9 X10(3)/MCL (ref 4.5–11.5)

## 2022-02-17 ENCOUNTER — HISTORICAL (OUTPATIENT)
Dept: ADMINISTRATIVE | Facility: HOSPITAL | Age: 67
End: 2022-02-17

## 2022-02-17 LAB
ABS NEUT (OLG): 4.47 (ref 2.1–9.2)
ANISOCYTOSIS BLD QL SMEAR: NORMAL
BASOPHILS # BLD AUTO: 0.03 10*3/UL (ref 0–0.2)
BASOPHILS NFR BLD AUTO: 0.4 % (ref 0–1)
EOSINOPHIL # BLD AUTO: 0.27 10*3/UL (ref 0–0.9)
EOSINOPHIL NFR BLD AUTO: 4 % (ref 0–6.4)
ERYTHROCYTE [DISTWIDTH] IN BLOOD BY AUTOMATED COUNT: 17.7 % (ref 11.5–17)
FERRITIN SERPL-MCNC: 264.75 NG/ML (ref 4.63–204)
HCT VFR BLD AUTO: 26.4 % (ref 37–47)
HGB BLD-MCNC: 8 G/DL (ref 12–16)
HYPOCHROMIA BLD QL SMEAR: NORMAL
IMM GRANULOCYTES # BLD AUTO: 0.02 10*3/UL (ref 0–0.02)
IMM GRANULOCYTES NFR BLD AUTO: 0.3 % (ref 0–0.43)
IRON SERPL-MCNC: 16 UG/DL (ref 50–170)
LYMPHOCYTES # BLD AUTO: 1.23 10*3/UL (ref 0.6–4.6)
LYMPHOCYTES NFR BLD AUTO: 18.4 % (ref 16–44)
MANUAL DIFF? (OHS): NO
MCH RBC QN AUTO: 21.8 PG (ref 27–31)
MCHC RBC AUTO-ENTMCNC: 30.3 G/DL (ref 33–36)
MCV RBC AUTO: 71.9 FL (ref 80–94)
MICROCYTES BLD QL SMEAR: NORMAL
MONOCYTES # BLD AUTO: 0.66 10*3/UL (ref 0.1–1.3)
MONOCYTES NFR BLD AUTO: 9.9 % (ref 4–12.1)
NEUTROPHILS # BLD AUTO: 4.47 10*3/UL (ref 2.1–9.2)
NEUTROPHILS NFR BLD AUTO: 67 % (ref 43–73)
NRBC BLD AUTO-RTO: 0 % (ref 0–0.2)
PLATELET # BLD AUTO: 240 10*3/UL (ref 130–400)
PLATELET # BLD EST: ADEQUATE 10*3/UL
PMV BLD AUTO: 11.2 FL (ref 7.4–10.4)
RBC # BLD AUTO: 3.67 10*6/UL (ref 4.2–5.4)
RBC MORPH BLD: NORMAL
SCAN RECIEVED (OHS): YES
SCAN RECIEVED (OHS): YES
WBC # SPEC AUTO: 6.7 10*3/UL (ref 4.5–11.5)

## 2022-03-03 ENCOUNTER — HISTORICAL (OUTPATIENT)
Dept: ANESTHESIOLOGY | Facility: HOSPITAL | Age: 67
End: 2022-03-03

## 2022-03-10 ENCOUNTER — HISTORICAL (OUTPATIENT)
Dept: ADMINISTRATIVE | Facility: HOSPITAL | Age: 67
End: 2022-03-10

## 2022-03-10 LAB
BUN SERPL-MCNC: 57.9 MG/DL (ref 9.8–20.1)
CALCIUM SERPL-MCNC: 8.7 MG/DL (ref 8.4–10.2)
CHLORIDE SERPL-SCNC: 105 MMOL/L (ref 98–107)
CO2 SERPL-SCNC: 25 MMOL/L (ref 23–31)
CREAT SERPL-MCNC: 2.48 MG/DL (ref 0.57–1.11)
CREAT/UREA NIT SERPL: 23
GLUCOSE SERPL-MCNC: 87 MG/DL (ref 82–115)
ICTERIC INTERF INDEX SERPL-ACNC: 1
LIPEMIC INTERF INDEX SERPL-ACNC: -1
POTASSIUM SERPL-SCNC: 4.9 MMOL/L (ref 3.5–5.1)
SODIUM SERPL-SCNC: 140 MMOL/L (ref 136–145)

## 2022-03-14 ENCOUNTER — HISTORICAL (OUTPATIENT)
Dept: ADMINISTRATIVE | Facility: HOSPITAL | Age: 67
End: 2022-03-14

## 2022-03-14 LAB
BUN SERPL-MCNC: 59.3 MG/DL (ref 9.8–20.1)
CALCIUM SERPL-MCNC: 9.1 MG/DL (ref 8.4–10.2)
CHLORIDE SERPL-SCNC: 105 MMOL/L (ref 98–107)
CO2 SERPL-SCNC: 25 MMOL/L (ref 23–31)
CREAT SERPL-MCNC: 3.02 MG/DL (ref 0.57–1.11)
CREAT/UREA NIT SERPL: 20
GLUCOSE SERPL-MCNC: 86 MG/DL (ref 82–115)
HEMOLYSIS INTERF INDEX SERPL-ACNC: -3
ICTERIC INTERF INDEX SERPL-ACNC: 1
LIPEMIC INTERF INDEX SERPL-ACNC: -2
POTASSIUM SERPL-SCNC: 5.3 MMOL/L (ref 3.5–5.1)
SODIUM SERPL-SCNC: 139 MMOL/L (ref 136–145)

## 2022-03-16 ENCOUNTER — HISTORICAL (OUTPATIENT)
Dept: ADMINISTRATIVE | Facility: HOSPITAL | Age: 67
End: 2022-03-16

## 2022-03-16 ENCOUNTER — HISTORICAL (OUTPATIENT)
Dept: RADIOLOGY | Facility: HOSPITAL | Age: 67
End: 2022-03-16

## 2022-03-16 LAB
ABS NEUT (OLG): 9.06 (ref 2.1–9.2)
ALBUMIN SERPL-MCNC: 2.8 G/DL (ref 3.4–4.8)
ALBUMIN/GLOB SERPL: 0.7 {RATIO} (ref 1.1–2)
ALP SERPL-CCNC: 61 U/L (ref 40–150)
ALT SERPL-CCNC: 8 U/L (ref 0–55)
APPEARANCE, UA: NORMAL
AST SERPL-CCNC: 13 U/L (ref 5–34)
BASOPHILS # BLD AUTO: 0.01 10*3/UL (ref 0–0.2)
BASOPHILS NFR BLD AUTO: 0.1 % (ref 0–1)
BILIRUB SERPL-MCNC: 0.1 MG/DL (ref 0.2–1.2)
BILIRUB UR QL STRIP.AUTO: NEGATIVE
BILIRUB UR QL STRIP: NEGATIVE
BILIRUBIN DIRECT+TOT PNL SERPL-MCNC: <0.1 (ref 0–0.5)
BILIRUBIN DIRECT+TOT PNL SERPL-MCNC: >0 (ref 0–0.8)
BUN SERPL-MCNC: 57.9 MG/DL (ref 9.8–20.1)
CALCIUM SERPL-MCNC: 8.8 MG/DL (ref 8.4–10.2)
CHLORIDE SERPL-SCNC: 110 MMOL/L (ref 98–107)
CK SERPL-CCNC: 46 U/L (ref 29–168)
CO2 SERPL-SCNC: 20 MMOL/L (ref 23–31)
COLOR UR: NORMAL
CREAT SERPL-MCNC: 2.43 MG/DL (ref 0.57–1.11)
CREAT UR-MCNC: 29.5 MG/DL (ref 45–106)
CREAT UR-MCNC: 30.3 MG/DL (ref 45–106)
DO MICRO?: YES
EOSINOPHIL # BLD AUTO: 0 10*3/UL (ref 0–0.9)
EOSINOPHIL NFR BLD AUTO: 0 % (ref 0–6.4)
ERYTHROCYTE [DISTWIDTH] IN BLOOD BY AUTOMATED COUNT: 17.2 % (ref 11.5–17)
GLOBULIN SER-MCNC: 3.9 G/DL (ref 2.4–3.5)
GLUCOSE (UA): NEGATIVE
GLUCOSE SERPL-MCNC: 128 MG/DL (ref 82–115)
GLUCOSE UR QL STRIP.AUTO: NEGATIVE
HCT VFR BLD AUTO: 26.5 % (ref 37–47)
HEMOLYSIS INTERF INDEX SERPL-ACNC: 8
HGB BLD-MCNC: 8.2 G/DL (ref 12–16)
HGB UR QL STRIP: NORMAL
ICTERIC INTERF INDEX SERPL-ACNC: 1
IMM GRANULOCYTES # BLD AUTO: 0.03 10*3/UL (ref 0–0.02)
IMM GRANULOCYTES NFR BLD AUTO: 0.3 % (ref 0–0.43)
KETONES UR QL STRIP.AUTO: NEGATIVE
KETONES UR QL STRIP: NEGATIVE
LEUKOCYTE ESTERASE UR QL STRIP.AUTO: NORMAL
LEUKOCYTE ESTERASE UR QL STRIP: NORMAL
LYMPHOCYTES # BLD AUTO: 1.18 10*3/UL (ref 0.6–4.6)
LYMPHOCYTES NFR BLD AUTO: 11.3 % (ref 16–44)
MANUAL DIFF? (OHS): NO
MCH RBC QN AUTO: 21.8 PG (ref 27–31)
MCHC RBC AUTO-ENTMCNC: 30.9 G/DL (ref 33–36)
MCV RBC AUTO: 70.5 FL (ref 80–94)
MICROALBUMIN UR-MCNC: 106.5
MICROALBUMIN/CREAT RATIO PNL UR: 351.5 (ref 0–30)
MONOCYTES # BLD AUTO: 0.19 10*3/UL (ref 0.1–1.3)
MONOCYTES NFR BLD AUTO: 1.8 % (ref 4–12.1)
NEUTROPHILS # BLD AUTO: 9.06 10*3/UL (ref 2.1–9.2)
NEUTROPHILS NFR BLD AUTO: 86.5 % (ref 43–73)
NITRITE UR QL STRIP: NEGATIVE
NRBC BLD AUTO-RTO: 0 % (ref 0–0.2)
PH UR STRIP: 6 [PH] (ref 5–7)
PLATELET # BLD AUTO: 370 10*3/UL (ref 130–400)
PMV BLD AUTO: 11 FL (ref 7.4–10.4)
POTASSIUM SERPL-SCNC: 5.6 MMOL/L (ref 3.5–5.1)
PROT SERPL-MCNC: 6.7 G/DL (ref 5.8–7.6)
PROT UR QL STRIP.AUTO: 30
PROT UR QL STRIP: NORMAL
PROT UR STRIP-MCNC: 38.6 MG/DL
RBC # BLD AUTO: 3.76 10*6/UL (ref 4.2–5.4)
RBC UR QL AUTO: NORMAL
SCAN RECIEVED (OHS): YES
SODIUM SERPL-SCNC: 139 MMOL/L (ref 136–145)
SP GR UR STRIP: 1.01 (ref 1–1.03)
UROBILINOGEN UR STRIP-ACNC: 0.2
UROBILINOGEN UR STRIP-ACNC: NEGATIVE
WBC # SPEC AUTO: 10.5 10*3/UL (ref 4.5–11.5)

## 2022-03-20 ENCOUNTER — HISTORICAL (OUTPATIENT)
Dept: ADMINISTRATIVE | Facility: HOSPITAL | Age: 67
End: 2022-03-20

## 2022-03-24 ENCOUNTER — HISTORICAL (OUTPATIENT)
Dept: ADMINISTRATIVE | Facility: HOSPITAL | Age: 67
End: 2022-03-24

## 2022-03-24 LAB
BUN SERPL-MCNC: 35.8 MG/DL (ref 9.8–20.1)
CALCIUM SERPL-MCNC: 8.8 MG/DL (ref 8.4–10.2)
CHLORIDE SERPL-SCNC: 108 MMOL/L (ref 98–107)
CO2 SERPL-SCNC: 25 MMOL/L (ref 23–31)
CREAT SERPL-MCNC: 1.88 MG/DL (ref 0.57–1.11)
CREAT/UREA NIT SERPL: 19
GLUCOSE SERPL-MCNC: 69 MG/DL (ref 82–115)
HEMOLYSIS INTERF INDEX SERPL-ACNC: 17
ICTERIC INTERF INDEX SERPL-ACNC: 1
LIPEMIC INTERF INDEX SERPL-ACNC: 1
POTASSIUM SERPL-SCNC: 4.8 MMOL/L (ref 3.5–5.1)
SODIUM SERPL-SCNC: 146 MMOL/L (ref 136–145)

## 2022-04-13 ENCOUNTER — HISTORICAL (OUTPATIENT)
Dept: ADMINISTRATIVE | Facility: HOSPITAL | Age: 67
End: 2022-04-13

## 2022-04-13 LAB
ABS NEUT (OLG): 2.23 (ref 2.1–9.2)
BASOPHILS # BLD AUTO: 0.02 10*3/UL (ref 0–0.2)
BASOPHILS NFR BLD AUTO: 0.4 % (ref 0–1)
BUN SERPL-MCNC: 32.8 MG/DL (ref 9.8–20.1)
CALCIUM SERPL-MCNC: 9.2 MG/DL (ref 8.4–10.2)
CHLORIDE SERPL-SCNC: 110 MMOL/L (ref 98–107)
CO2 SERPL-SCNC: 26 MMOL/L (ref 23–31)
CREAT SERPL-MCNC: 1.39 MG/DL (ref 0.57–1.11)
CREAT/UREA NIT SERPL: 24
EOSINOPHIL # BLD AUTO: 0.28 10*3/UL (ref 0–0.9)
EOSINOPHIL NFR BLD AUTO: 5.7 % (ref 0–6.4)
ERYTHROCYTE [DISTWIDTH] IN BLOOD BY AUTOMATED COUNT: 19.8 % (ref 11.5–17)
GLUCOSE SERPL-MCNC: 74 MG/DL (ref 82–115)
HCT VFR BLD AUTO: 30.2 % (ref 37–47)
HEMOLYSIS INTERF INDEX SERPL-ACNC: 17
HGB BLD-MCNC: 8.9 G/DL (ref 12–16)
ICTERIC INTERF INDEX SERPL-ACNC: 1
IMM GRANULOCYTES # BLD AUTO: 0.01 10*3/UL (ref 0–0.02)
IMM GRANULOCYTES NFR BLD AUTO: 0.2 % (ref 0–0.43)
LIPEMIC INTERF INDEX SERPL-ACNC: 3
LYMPHOCYTES # BLD AUTO: 1.9 10*3/UL (ref 0.6–4.6)
LYMPHOCYTES NFR BLD AUTO: 38.4 % (ref 16–44)
MANUAL DIFF? (OHS): NO
MCH RBC QN AUTO: 22.8 PG (ref 27–31)
MCHC RBC AUTO-ENTMCNC: 29.5 G/DL (ref 33–36)
MCV RBC AUTO: 77.2 FL (ref 80–94)
MONOCYTES # BLD AUTO: 0.51 10*3/UL (ref 0.1–1.3)
MONOCYTES NFR BLD AUTO: 10.3 % (ref 4–12.1)
NEUTROPHILS # BLD AUTO: 2.23 10*3/UL (ref 2.1–9.2)
NEUTROPHILS NFR BLD AUTO: 45 % (ref 43–73)
NRBC BLD AUTO-RTO: 0 % (ref 0–0.2)
PLATELET # BLD AUTO: 204 10*3/UL (ref 130–400)
PLATELETS.RETICULATED NFR BLD AUTO: 5.6 % (ref 0.7–8.1)
PMV BLD AUTO: 11.7 FL (ref 7.4–10.4)
POTASSIUM SERPL-SCNC: 4.7 MMOL/L (ref 3.5–5.1)
RBC # BLD AUTO: 3.91 10*6/UL (ref 4.2–5.4)
SCAN RECIEVED (OHS): YES
SODIUM SERPL-SCNC: 143 MMOL/L (ref 136–145)
WBC # SPEC AUTO: 5 10*3/UL (ref 4.5–11.5)

## 2022-04-18 ENCOUNTER — HISTORICAL (OUTPATIENT)
Dept: ADMINISTRATIVE | Facility: HOSPITAL | Age: 67
End: 2022-04-18
Payer: MEDICARE

## 2022-04-18 LAB
ABS NEUT (OLG): 2.77 (ref 2.1–9.2)
ALBUMIN SERPL-MCNC: 3.1 G/DL (ref 3.4–4.8)
ALBUMIN/GLOB SERPL: 1.1 {RATIO} (ref 1.1–2)
ALP SERPL-CCNC: 63 U/L (ref 40–150)
ALT SERPL-CCNC: 10 U/L (ref 0–55)
AST SERPL-CCNC: 14 U/L (ref 5–34)
BASOPHILS # BLD AUTO: 0.04 10*3/UL (ref 0–0.2)
BASOPHILS NFR BLD AUTO: 0.7 % (ref 0–1)
BILIRUB SERPL-MCNC: 0.2 MG/DL (ref 0.2–1.2)
BILIRUBIN DIRECT+TOT PNL SERPL-MCNC: 0.1 (ref 0–0.5)
BILIRUBIN DIRECT+TOT PNL SERPL-MCNC: 0.1 (ref 0–0.8)
BUN SERPL-MCNC: 44.3 MG/DL (ref 9.8–20.1)
CALCIUM SERPL-MCNC: 8.7 MG/DL (ref 8.4–10.2)
CHLORIDE SERPL-SCNC: 110 MMOL/L (ref 98–107)
CO2 SERPL-SCNC: 25 MMOL/L (ref 23–31)
CREAT SERPL-MCNC: 1.27 MG/DL (ref 0.57–1.11)
EOSINOPHIL # BLD AUTO: 0.24 10*3/UL (ref 0–0.9)
EOSINOPHIL NFR BLD AUTO: 4.5 % (ref 0–6.4)
ERYTHROCYTE [DISTWIDTH] IN BLOOD BY AUTOMATED COUNT: 19.7 % (ref 11.5–17)
GLOBULIN SER-MCNC: 2.8 G/DL (ref 2.4–3.5)
GLUCOSE SERPL-MCNC: 73 MG/DL (ref 82–115)
HCT VFR BLD AUTO: 28.6 % (ref 37–47)
HEMOLYSIS INTERF INDEX SERPL-ACNC: -1
HGB BLD-MCNC: 8.5 G/DL (ref 12–16)
ICTERIC INTERF INDEX SERPL-ACNC: 1
IMM GRANULOCYTES # BLD AUTO: 0.01 10*3/UL (ref 0–0.02)
IMM GRANULOCYTES NFR BLD AUTO: 0.2 % (ref 0–0.43)
LYMPHOCYTES # BLD AUTO: 1.81 10*3/UL (ref 0.6–4.6)
LYMPHOCYTES NFR BLD AUTO: 33.6 % (ref 16–44)
MANUAL DIFF? (OHS): NO
MCH RBC QN AUTO: 22.9 PG (ref 27–31)
MCHC RBC AUTO-ENTMCNC: 29.7 G/DL (ref 33–36)
MCV RBC AUTO: 77.1 FL (ref 80–94)
MONOCYTES # BLD AUTO: 0.52 10*3/UL (ref 0.1–1.3)
MONOCYTES NFR BLD AUTO: 9.6 % (ref 4–12.1)
NEUTROPHILS # BLD AUTO: 2.77 10*3/UL (ref 2.1–9.2)
NEUTROPHILS NFR BLD AUTO: 51.4 % (ref 43–73)
NRBC BLD AUTO-RTO: 0 % (ref 0–0.2)
PLATELET # BLD AUTO: 165 10*3/UL (ref 130–400)
PLATELETS.RETICULATED NFR BLD AUTO: 6.1 % (ref 0.7–8.1)
PMV BLD AUTO: 12.2 FL (ref 7.4–10.4)
POTASSIUM SERPL-SCNC: 4.4 MMOL/L (ref 3.5–5.1)
PROT SERPL-MCNC: 5.9 G/DL (ref 5.8–7.6)
RBC # BLD AUTO: 3.71 10*6/UL (ref 4.2–5.4)
SCAN RECIEVED (OHS): YES
SODIUM SERPL-SCNC: 144 MMOL/L (ref 136–145)
WBC # SPEC AUTO: 5.4 10*3/UL (ref 4.5–11.5)

## 2022-05-02 ENCOUNTER — LAB REQUISITION (OUTPATIENT)
Dept: LAB | Facility: HOSPITAL | Age: 67
End: 2022-05-02
Payer: MEDICARE

## 2022-05-02 DIAGNOSIS — N39.0 URINARY TRACT INFECTION, SITE NOT SPECIFIED: ICD-10-CM

## 2022-05-02 PROCEDURE — 81003 URINALYSIS AUTO W/O SCOPE: CPT | Performed by: UROLOGY

## 2022-05-02 PROCEDURE — 80048 BASIC METABOLIC PNL TOTAL CA: CPT | Performed by: UROLOGY

## 2022-05-02 PROCEDURE — 85025 COMPLETE CBC W/AUTO DIFF WBC: CPT | Performed by: UROLOGY

## 2022-05-02 PROCEDURE — 87077 CULTURE AEROBIC IDENTIFY: CPT | Performed by: UROLOGY

## 2022-05-02 PROCEDURE — 81015 MICROSCOPIC EXAM OF URINE: CPT | Performed by: UROLOGY

## 2022-05-03 LAB
ANION GAP SERPL CALC-SCNC: 9 MEQ/L
APPEARANCE UR: ABNORMAL
BACTERIA #/AREA URNS AUTO: ABNORMAL /HPF
BASOPHILS # BLD AUTO: 0.04 X10(3)/MCL (ref 0–0.2)
BASOPHILS NFR BLD AUTO: 0.6 %
BILIRUB UR QL STRIP.AUTO: NEGATIVE MG/DL
BUN SERPL-MCNC: 39.9 MG/DL (ref 9.8–20.1)
CALCIUM SERPL-MCNC: 9.3 MG/DL (ref 8.4–10.2)
CHLORIDE SERPL-SCNC: 108 MMOL/L (ref 98–107)
CO2 SERPL-SCNC: 26 MMOL/L (ref 23–31)
COLOR UR AUTO: YELLOW
CREAT SERPL-MCNC: 1.2 MG/DL (ref 0.55–1.02)
CREAT/UREA NIT SERPL: 33
EOSINOPHIL # BLD AUTO: 0.3 X10(3)/MCL (ref 0–0.9)
EOSINOPHIL NFR BLD AUTO: 4.4 %
ERYTHROCYTE [DISTWIDTH] IN BLOOD BY AUTOMATED COUNT: 19.6 % (ref 11.5–17)
GLUCOSE SERPL-MCNC: 87 MG/DL (ref 82–115)
GLUCOSE UR QL STRIP.AUTO: NEGATIVE MG/DL
HCT VFR BLD AUTO: 30.5 % (ref 37–47)
HGB BLD-MCNC: 9.3 GM/DL (ref 12–16)
IMM GRANULOCYTES # BLD AUTO: 0.01 X10(3)/MCL (ref 0–0.02)
IMM GRANULOCYTES NFR BLD AUTO: 0.1 % (ref 0–0.43)
KETONES UR QL STRIP.AUTO: NEGATIVE MG/DL
LEUKOCYTE ESTERASE UR QL STRIP.AUTO: ABNORMAL UNIT/L
LYMPHOCYTES # BLD AUTO: 3.99 X10(3)/MCL (ref 0.6–4.6)
LYMPHOCYTES NFR BLD AUTO: 29.3 %
MCH RBC QN AUTO: 23.4 PG (ref 27–31)
MCHC RBC AUTO-ENTMCNC: 30.5 MG/DL (ref 33–36)
MCV RBC AUTO: 76.6 FL (ref 80–94)
MONOCYTES # BLD AUTO: 0.47 X10(3)/MCL (ref 0.1–1.3)
MONOCYTES NFR BLD AUTO: 6.9 %
NEUTROPHILS # BLD AUTO: 2 X10(3)/MCL (ref 2.1–9.2)
NEUTROPHILS NFR BLD AUTO: 58.7 %
NITRITE UR QL STRIP.AUTO: POSITIVE
NRBC BLD AUTO-RTO: 0 %
PH UR STRIP.AUTO: 7.5 [PH]
PLATELET # BLD AUTO: 263 X10(3)/MCL (ref 130–400)
PLATELETS.RETICULATED NFR BLD AUTO: ABNORMAL %
PMV BLD AUTO: 11.6 FL (ref 9.4–12.4)
POTASSIUM SERPL-SCNC: 4.9 MMOL/L (ref 3.5–5.1)
PROT UR QL STRIP.AUTO: 30 MG/DL
RBC # BLD AUTO: 3.98 X10(6)/MCL (ref 4.2–5.4)
RBC #/AREA URNS AUTO: >=100 /HPF
RBC UR QL AUTO: ABNORMAL UNIT/L
SODIUM SERPL-SCNC: 143 MMOL/L (ref 136–145)
SP GR UR STRIP.AUTO: 1.01
SQUAMOUS #/AREA URNS AUTO: ABNORMAL /LPF
UROBILINOGEN UR STRIP-ACNC: 0.2 MG/DL
WBC # SPEC AUTO: 6.8 X10(3)/MCL (ref 4.5–11.5)
WBC #/AREA URNS AUTO: ABNORMAL /HPF

## 2022-05-05 LAB — BACTERIA UR CULT: ABNORMAL

## 2022-05-13 ENCOUNTER — LAB REQUISITION (OUTPATIENT)
Dept: LAB | Facility: HOSPITAL | Age: 67
End: 2022-05-13
Payer: MEDICARE

## 2022-05-13 DIAGNOSIS — N17.9 ACUTE KIDNEY FAILURE, UNSPECIFIED: ICD-10-CM

## 2022-05-13 LAB
BASOPHILS # BLD AUTO: 0.03 X10(3)/MCL (ref 0–0.2)
BASOPHILS NFR BLD AUTO: 0.5 %
EOSINOPHIL # BLD AUTO: 0.36 X10(3)/MCL (ref 0–0.9)
EOSINOPHIL NFR BLD AUTO: 6.3 %
ERYTHROCYTE [DISTWIDTH] IN BLOOD BY AUTOMATED COUNT: 19.2 % (ref 11.5–17)
FERRITIN SERPL-MCNC: 127.22 NG/ML (ref 4.63–204)
HCT VFR BLD AUTO: 31.1 % (ref 37–47)
HGB BLD-MCNC: 9.4 GM/DL (ref 12–16)
IMM GRANULOCYTES # BLD AUTO: 0.01 X10(3)/MCL (ref 0–0.02)
IMM GRANULOCYTES NFR BLD AUTO: 0.2 % (ref 0–0.43)
IRON SATN MFR SERPL: 18 % (ref 20–50)
IRON SERPL-MCNC: 39 UG/DL (ref 50–170)
LYMPHOCYTES # BLD AUTO: 2.31 X10(3)/MCL (ref 0.6–4.6)
LYMPHOCYTES NFR BLD AUTO: 40.3 %
MCH RBC QN AUTO: 24 PG (ref 27–31)
MCHC RBC AUTO-ENTMCNC: 30.2 MG/DL (ref 33–36)
MCV RBC AUTO: 79.3 FL (ref 80–94)
MONOCYTES # BLD AUTO: 0.51 X10(3)/MCL (ref 0.1–1.3)
MONOCYTES NFR BLD AUTO: 8.9 %
NEUTROPHILS # BLD AUTO: 2.5 X10(3)/MCL (ref 2.1–9.2)
NEUTROPHILS NFR BLD AUTO: 43.8 %
NRBC BLD AUTO-RTO: 0 %
PLATELET # BLD AUTO: 185 X10(3)/MCL (ref 130–400)
PLATELETS.RETICULATED NFR BLD AUTO: 5 % (ref 0.9–11.2)
PMV BLD AUTO: 12 FL (ref 9.4–12.4)
RBC # BLD AUTO: 3.92 X10(6)/MCL (ref 4.2–5.4)
TIBC SERPL-MCNC: 180 UG/DL (ref 70–310)
TIBC SERPL-MCNC: 219 UG/DL (ref 250–450)
TRANSFERRIN SERPL-MCNC: 208 MG/DL (ref 173–360)
WBC # SPEC AUTO: 5.7 X10(3)/MCL (ref 4.5–11.5)

## 2022-05-13 PROCEDURE — 83540 ASSAY OF IRON: CPT | Performed by: FAMILY MEDICINE

## 2022-05-13 PROCEDURE — 82728 ASSAY OF FERRITIN: CPT | Performed by: FAMILY MEDICINE

## 2022-05-13 PROCEDURE — 85025 COMPLETE CBC W/AUTO DIFF WBC: CPT | Performed by: FAMILY MEDICINE

## 2022-05-14 NOTE — OP NOTE
DATE OF SURGERY:        SURGEON:  Perry Bridges M.D.    ADMITTING DIAGNOSES:    1. Iron deficiency anemia of undetermined etiology with occult positive stools on 10/20/2021.  2. Hemoglobin of 8, hematocrit of 26.4 on 02/17/2022.  3. Family history of mother having colon cancer.    PROCEDURE:  Esophagogastroduodenoscopy with biopsy of the antrum.    POSTOPERATIVE DIAGNOSES:    1. Normal duodenum in all 4 portions and into the jejunum.  2. Normal antrum, fundus, and cardia of the stomach with a biopsy of the antrum taken for histopathology and Helicobacter pylori.  3. Z-line at 38 cm with normal esophagus, cricopharyngeus muscle, vocal cords.    INDICATIONS:  The patient is a 67-year-old  female with gastroesophageal reflux disease, anxiety, hypothyroidism, osteopenia, and iron deficiency anemia.  The patient required endoscopic evaluation of the upper gastrointestinal tract for iron deficiency anemia noted with a hemoglobin of 8, hematocrit 24 on 02/17/2022.  The patient's mother had colon cancer.  The patient herself had 4 stools, 1 was positive for blood, the other 3 were negative.  Last colonoscopy was in 2015 and was negative.  The patient was scheduled for an EGD initially to evaluate for possible upper source of bleeding and anemia.  The patient was brought to the GI suite, underwent sedation and examination of esophagus, stomach, and duodenum.     Duodenum was normal in all 4 portions and into the jejunum.  The pylorus was intubated without difficulty.  Antrum, fundus, and cardia of the stomach were normal.  Z-line was at 40 cm.  The esophagus, cricopharyngeus muscle, and vocal cords were normal throughout.  No other abnormalities were seen.  Overall, the patient did very well and had no problems or difficulties.  Was awakened and sent to recovery in good condition.       I appreciate the consultation referral from Dr. Alvarado and will notify him of my  findings.        BBS/MODL   DD: 03/03/2022 1124   DT: 03/03/2022 1135  Job # [id number]    cc: KAMLA Alvarado M.D.

## 2022-05-30 ENCOUNTER — DOCUMENTATION ONLY (OUTPATIENT)
Dept: HEMATOLOGY/ONCOLOGY | Facility: CLINIC | Age: 67
End: 2022-05-30
Payer: MEDICARE

## 2022-05-30 NOTE — NURSING
Spoke with Rebecca at the [facility] informed that they need bring a list of her medication, ID, and insurance card. Informed that the person that bring her here needs to stay with patient.

## 2022-05-31 ENCOUNTER — OFFICE VISIT (OUTPATIENT)
Dept: HEMATOLOGY/ONCOLOGY | Facility: CLINIC | Age: 67
End: 2022-05-31
Payer: MEDICARE

## 2022-05-31 VITALS
HEART RATE: 72 BPM | RESPIRATION RATE: 19 BRPM | DIASTOLIC BLOOD PRESSURE: 90 MMHG | SYSTOLIC BLOOD PRESSURE: 150 MMHG | OXYGEN SATURATION: 98 % | TEMPERATURE: 96 F | HEIGHT: 68 IN

## 2022-05-31 DIAGNOSIS — E46 PROTEIN-CALORIE MALNUTRITION, UNSPECIFIED SEVERITY: ICD-10-CM

## 2022-05-31 DIAGNOSIS — R97.8 OTHER ABNORMAL TUMOR MARKERS: ICD-10-CM

## 2022-05-31 DIAGNOSIS — N18.9 CHRONIC RENAL IMPAIRMENT, UNSPECIFIED CKD STAGE: ICD-10-CM

## 2022-05-31 DIAGNOSIS — R19.5 POSITIVE OCCULT STOOL BLOOD TEST: ICD-10-CM

## 2022-05-31 DIAGNOSIS — D64.9 ANEMIA, UNSPECIFIED TYPE: Primary | ICD-10-CM

## 2022-05-31 PROCEDURE — 99999 PR PBB SHADOW E&M-EST. PATIENT-LVL III: ICD-10-PCS | Mod: PBBFAC,,, | Performed by: INTERNAL MEDICINE

## 2022-05-31 PROCEDURE — 99205 PR OFFICE/OUTPT VISIT, NEW, LEVL V, 60-74 MIN: ICD-10-PCS | Mod: S$PBB,,, | Performed by: INTERNAL MEDICINE

## 2022-05-31 PROCEDURE — 99205 OFFICE O/P NEW HI 60 MIN: CPT | Mod: S$PBB,,, | Performed by: INTERNAL MEDICINE

## 2022-05-31 PROCEDURE — 99999 PR PBB SHADOW E&M-EST. PATIENT-LVL III: CPT | Mod: PBBFAC,,, | Performed by: INTERNAL MEDICINE

## 2022-05-31 PROCEDURE — 99213 OFFICE O/P EST LOW 20 MIN: CPT | Mod: PBBFAC | Performed by: INTERNAL MEDICINE

## 2022-05-31 RX ORDER — CARVEDILOL 12.5 MG/1
6.25 TABLET ORAL 2 TIMES DAILY
COMMUNITY

## 2022-05-31 RX ORDER — CHROMIUM PICOLINATE 200 MCG
1 TABLET ORAL DAILY
COMMUNITY

## 2022-05-31 NOTE — PROGRESS NOTES
PATIENT: Jenna Cuenca  MRN: [id number]  DATE: 5/31/2022        Chief Complaint: NPH for Anemia No complaints.     Hematologic History: Anemia        Subjective:   Interval History: Ms. Cuenca is a 67 y.o. female who is seen today for initial apt for anemia, referred by Dr Alvarado. She resides at the [facility]. She is here today via ambulance on stretcher with EMS. She states that she ambulates via wheelchair at the shelter and fed by staff. She is in good spirits and communicates well. She states that she has been there the past few years and enjoys it there. Denies easy bruising or bleeding. Patient had positive hemoccult test at NH. EGD was performed with normal result. Pending colonoscopy per PCP note.    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 No pertinent surgical history.    Family History: History reviewed. No pertinent family history.    Social History:  reports that she has never smoked. She has never used smokeless tobacco. She reports that she does not drink alcohol and does not use drugs.    Allergies:  Review  "of patient's allergies indicates:  Not on File    Medications:  Current Outpatient Medications   Medication Sig Dispense Refill    calcium carbonate-vitamin D3 600 mg-10 mcg (400 unit) Cap Take 1 tablet by mouth Daily.      carvediloL (COREG) 12.5 MG tablet Take 12.5 mg by mouth 2 (two) times a day.      vitamin B complex (B COMPLEX 1 ORAL) Take 1 tablet by mouth Daily.       No current facility-administered medications for this visit.       Review of Systems   Constitutional: Positive for activity change.   HENT: Negative.    Eyes: Negative.    Respiratory: Negative.    Cardiovascular: Negative.    Gastrointestinal: Negative.    Endocrine: Negative.    Genitourinary: Negative.    Musculoskeletal:        Body contracture   Skin: Negative.    Allergic/Immunologic: Negative.    Neurological: Positive for weakness.   Hematological: Negative.    Psychiatric/Behavioral: Negative.    All other systems reviewed and are negative.      ECOG Performance Status:   ECOG SCORE           Objective:      Vitals:   Vitals:    05/31/22 1418   BP: (!) 150/90   BP Location: Right arm   Pulse: 72   Resp: 19   Temp: (!) 95.7 °F (35.4 °C)   SpO2: 98%   Height: 5' 8" (1.727 m)     BMI: There is no height or weight on file to calculate BMI.    Physical Exam  Vitals and nursing note reviewed.   Constitutional:       Appearance: Normal appearance.   HENT:      Head: Normocephalic and atraumatic.      Nose: Nose normal.   Eyes:      Conjunctiva/sclera: Conjunctivae normal.   Cardiovascular:      Rate and Rhythm: Normal rate and regular rhythm.      Pulses: Normal pulses.      Heart sounds: Normal heart sounds.   Pulmonary:      Effort: Pulmonary effort is normal.      Breath sounds: Normal breath sounds.   Abdominal:      General: Abdomen is flat. There is no distension.      Tenderness: There is no abdominal tenderness.   Musculoskeletal:      Cervical back: No tenderness.      Comments: General body contractures   Neurological:      " Mental Status: She is alert.      Motor: Weakness present.      Comments: Only able to ambulate with wheelchairs at NH.         Laboratory Data:  No visits with results within 1 Week(s) from this visit.   Latest known visit with results is:   Lab Requisition on 05/13/2022   Component Date Value Ref Range Status    Iron Binding Capacity Unsaturated 05/13/2022 180  70 - 310 ug/dL Final    Iron Level 05/13/2022 39 (A) 50 - 170 ug/dL Final    Transferrin 05/13/2022 208  173 - 360 mg/dL Final    Iron Binding Capacity Total 05/13/2022 219 (A) 250 - 450 ug/dL Final    Iron Saturation 05/13/2022 18 (A) 20 - 50 % Final    Ferritin Level 05/13/2022 127.22  4.63 - 204.00 ng/mL Final    WBC 05/13/2022 5.7  4.5 - 11.5 x10(3)/mcL Final    RBC 05/13/2022 3.92 (A) 4.20 - 5.40 x10(6)/mcL Final    Hgb 05/13/2022 9.4 (A) 12.0 - 16.0 gm/dL Final    Hct 05/13/2022 31.1 (A) 37.0 - 47.0 % Final    MCV 05/13/2022 79.3 (A) 80.0 - 94.0 fL Final    MCH 05/13/2022 24.0 (A) 27.0 - 31.0 pg Final    MCHC 05/13/2022 30.2 (A) 33.0 - 36.0 mg/dL Final    RDW 05/13/2022 19.2 (A) 11.5 - 17.0 % Final    Platelet 05/13/2022 185  130 - 400 x10(3)/mcL Final    MPV 05/13/2022 12.0  9.4 - 12.4 fL Final    IPF 05/13/2022 5.0  0.9 - 11.2 % Final    Neut % 05/13/2022 43.8  % Final    Lymph % 05/13/2022 40.3  % Final    Mono % 05/13/2022 8.9  % Final    Eos % 05/13/2022 6.3  % Final    Basophil % 05/13/2022 0.5  % Final    Lymph # 05/13/2022 2.31  0.6 - 4.6 x10(3)/mcL Final    Neut # 05/13/2022 2.5  2.1 - 9.2 x10(3)/mcL Final    Mono # 05/13/2022 0.51  0.1 - 1.3 x10(3)/mcL Final    Eos # 05/13/2022 0.36  0 - 0.9 x10(3)/mcL Final    Baso # 05/13/2022 0.03  0 - 0.2 x10(3)/mcL Final    IG# 05/13/2022 0.01  0 - 0.0155 x10(3)/mcL Final    IG% 05/13/2022 0.2  0 - 0.43 % Final    NRBC% 05/13/2022 0.0  % Final         Imaging:   Assessment:     1. Anemia: probably secondary to CRI  Need to r/o nutrient deficiency, hemolysis, or  thalassemia.  Will do anemia work up  Start Ferrous sulfate 325 mg po BID as tolerated  Might consider to start Procrit/Aranesp later if H/H is not improved with Ferritin >100.    2. CRI: F/U with Nephrologist/PCP          Plan:  Follow up in 2 weeks with MD via telemedicine to go over labs.   Will inform NH to start Ferrous Sulfate 325mg po BID  CBC, CMP, Iron, Ferritin, Folate, B12, LDH, CRP, EPO, Haptoglobin, Hgb elect, Peripheral smear, Retic, CEA     Problem List Items Addressed This Visit    None

## 2022-06-01 ENCOUNTER — LAB REQUISITION (OUTPATIENT)
Dept: LAB | Facility: HOSPITAL | Age: 67
End: 2022-06-01
Payer: MEDICARE

## 2022-06-01 DIAGNOSIS — D63.8 ANEMIA IN OTHER CHRONIC DISEASES CLASSIFIED ELSEWHERE: ICD-10-CM

## 2022-06-01 LAB
ALBUMIN SERPL-MCNC: 3.5 GM/DL (ref 3.4–4.8)
ALBUMIN/GLOB SERPL: 1.3 RATIO (ref 1.1–2)
ALP SERPL-CCNC: 58 UNIT/L (ref 40–150)
ALT SERPL-CCNC: 13 UNIT/L (ref 0–55)
ANISOCYTOSIS BLD QL SMEAR: ABNORMAL
AST SERPL-CCNC: 17 UNIT/L (ref 5–34)
BASOPHILS # BLD AUTO: 0.04 X10(3)/MCL (ref 0–0.2)
BASOPHILS NFR BLD AUTO: 0.7 %
BILIRUBIN DIRECT+TOT PNL SERPL-MCNC: 0.2 MG/DL
BUN SERPL-MCNC: 51 MG/DL (ref 9.8–20.1)
CALCIUM SERPL-MCNC: 9.5 MG/DL (ref 8.4–10.2)
CHLORIDE SERPL-SCNC: 105 MMOL/L (ref 98–107)
CO2 SERPL-SCNC: 29 MMOL/L (ref 23–31)
CREAT SERPL-MCNC: 1.33 MG/DL (ref 0.55–1.02)
CRP SERPL-MCNC: <0.4 MG/L
ELLIPTOCYTOSIS (OHS): ABNORMAL
EOSINOPHIL # BLD AUTO: 0.42 X10(3)/MCL (ref 0–0.9)
EOSINOPHIL NFR BLD AUTO: 7.7 %
ERYTHROCYTE [DISTWIDTH] IN BLOOD BY AUTOMATED COUNT: 18 % (ref 11.5–17)
FERRITIN SERPL-MCNC: 94.65 NG/ML (ref 4.63–204)
FOLATE SERPL-MCNC: 12.7 NG/ML (ref 7–31.4)
GLOBULIN SER-MCNC: 2.8 GM/DL (ref 2.4–3.5)
GLUCOSE SERPL-MCNC: 82 MG/DL (ref 82–115)
HCT VFR BLD AUTO: 34 % (ref 37–47)
HGB BLD-MCNC: 10.3 GM/DL (ref 12–16)
HYPOCHROMIA BLD QL SMEAR: ABNORMAL
IMM GRANULOCYTES # BLD AUTO: 0 X10(3)/MCL (ref 0–0.02)
IMM GRANULOCYTES NFR BLD AUTO: 0 % (ref 0–0.43)
LDH SERPL-CCNC: 291 U/L (ref 125–220)
LYMPHOCYTES # BLD AUTO: 1.92 X10(3)/MCL (ref 0.6–4.6)
LYMPHOCYTES NFR BLD AUTO: 35 %
MCH RBC QN AUTO: 24.3 PG (ref 27–31)
MCHC RBC AUTO-ENTMCNC: 30.3 MG/DL (ref 33–36)
MCV RBC AUTO: 80.4 FL (ref 80–94)
MICROCYTES BLD QL SMEAR: SLIGHT
MONOCYTES # BLD AUTO: 0.47 X10(3)/MCL (ref 0.1–1.3)
MONOCYTES NFR BLD AUTO: 8.6 %
NEUTROPHILS # BLD AUTO: 2.6 X10(3)/MCL (ref 2.1–9.2)
NEUTROPHILS NFR BLD AUTO: 48 %
NRBC BLD AUTO-RTO: 0 %
OVALOCYTES (OLG): ABNORMAL
PLATELET # BLD AUTO: 160 X10(3)/MCL (ref 130–400)
PLATELET # BLD EST: ADEQUATE 10*3/UL
PLATELETS.RETICULATED NFR BLD AUTO: 6.9 % (ref 0.9–11.2)
PMV BLD AUTO: ABNORMAL FL
POTASSIUM SERPL-SCNC: 5 MMOL/L (ref 3.5–5.1)
PROT SERPL-MCNC: 6.3 GM/DL (ref 5.8–7.6)
RBC # BLD AUTO: 4.23 X10(6)/MCL (ref 4.2–5.4)
RBC MORPH BLD: ABNORMAL
RET# (OHS): 0.03 (ref 0.02–0.08)
RETICULOCYTE COUNT AUTOMATED (OLG): 0.66 % (ref 1.1–2.1)
SCHISTOCYTE (OLG): ABNORMAL
SODIUM SERPL-SCNC: 142 MMOL/L (ref 136–145)
VIT B12 SERPL-MCNC: >2000 PG/ML (ref 213–816)
WBC # SPEC AUTO: 5.5 X10(3)/MCL (ref 4.5–11.5)

## 2022-06-01 PROCEDURE — 83615 LACTATE (LD) (LDH) ENZYME: CPT | Performed by: FAMILY MEDICINE

## 2022-06-01 PROCEDURE — 85045 AUTOMATED RETICULOCYTE COUNT: CPT | Performed by: FAMILY MEDICINE

## 2022-06-01 PROCEDURE — 83068 HEMOGLOBIN UNSTABLE SCREEN: CPT | Performed by: FAMILY MEDICINE

## 2022-06-01 PROCEDURE — 85025 COMPLETE CBC W/AUTO DIFF WBC: CPT | Performed by: FAMILY MEDICINE

## 2022-06-01 PROCEDURE — 80053 COMPREHEN METABOLIC PANEL: CPT | Performed by: FAMILY MEDICINE

## 2022-06-01 PROCEDURE — 82746 ASSAY OF FOLIC ACID SERUM: CPT | Performed by: FAMILY MEDICINE

## 2022-06-01 PROCEDURE — 83020 HEMOGLOBIN ELECTROPHORESIS: CPT | Mod: 59 | Performed by: FAMILY MEDICINE

## 2022-06-01 PROCEDURE — 82378 CARCINOEMBRYONIC ANTIGEN: CPT | Performed by: FAMILY MEDICINE

## 2022-06-01 PROCEDURE — 86140 C-REACTIVE PROTEIN: CPT | Performed by: FAMILY MEDICINE

## 2022-06-01 PROCEDURE — 83010 ASSAY OF HAPTOGLOBIN QUANT: CPT | Performed by: FAMILY MEDICINE

## 2022-06-01 PROCEDURE — 82728 ASSAY OF FERRITIN: CPT | Performed by: FAMILY MEDICINE

## 2022-06-01 PROCEDURE — 82664 ELECTROPHORETIC TEST: CPT | Performed by: FAMILY MEDICINE

## 2022-06-01 PROCEDURE — 82668 ASSAY OF ERYTHROPOIETIN: CPT | Performed by: FAMILY MEDICINE

## 2022-06-01 PROCEDURE — 82607 VITAMIN B-12: CPT | Performed by: FAMILY MEDICINE

## 2022-06-02 LAB
CEA SERPL-MCNC: <1.73 NG/ML (ref 0–3)
HAPTOGLOB SERPL-MCNC: 142 MG/DL (ref 63–273)

## 2022-06-04 LAB — EPO SERPL-ACNC: 7.9 MIU/ML (ref 2.6–18.5)

## 2022-06-06 LAB
HGB A MFR BLD ELPH: 97.5 % (ref 95.8–98)
HGB A2 MFR BLD: 2.5 % (ref 2–3.3)
HGB F MFR BLD: 0 % (ref 0–0.9)
HGB FRACT BLD ELPH-IMP: NORMAL
M HPLC HB VARIANT, B: NORMAL

## 2022-06-13 LAB — VIEW PATHOLOGY REPORT (RELIAPATH): NORMAL

## 2022-06-22 ENCOUNTER — OFFICE VISIT (OUTPATIENT)
Dept: HEMATOLOGY/ONCOLOGY | Facility: CLINIC | Age: 67
End: 2022-06-22
Payer: MEDICARE

## 2022-06-22 ENCOUNTER — LAB VISIT (OUTPATIENT)
Dept: LAB | Facility: HOSPITAL | Age: 67
End: 2022-06-22
Payer: MEDICARE

## 2022-06-22 VITALS
HEIGHT: 68 IN | SYSTOLIC BLOOD PRESSURE: 114 MMHG | TEMPERATURE: 97 F | DIASTOLIC BLOOD PRESSURE: 74 MMHG | RESPIRATION RATE: 18 BRPM | OXYGEN SATURATION: 100 % | HEART RATE: 73 BPM

## 2022-06-22 DIAGNOSIS — N18.9 CHRONIC RENAL IMPAIRMENT, UNSPECIFIED CKD STAGE: ICD-10-CM

## 2022-06-22 DIAGNOSIS — D64.9 ANEMIA, UNSPECIFIED TYPE: Primary | ICD-10-CM

## 2022-06-22 DIAGNOSIS — R19.5 POSITIVE OCCULT STOOL BLOOD TEST: ICD-10-CM

## 2022-06-22 DIAGNOSIS — E46 PROTEIN-CALORIE MALNUTRITION, UNSPECIFIED SEVERITY: ICD-10-CM

## 2022-06-22 DIAGNOSIS — R97.8 OTHER ABNORMAL TUMOR MARKERS: ICD-10-CM

## 2022-06-22 LAB
BASOPHILS # BLD AUTO: 0.03 X10(3)/MCL (ref 0–0.2)
BASOPHILS NFR BLD AUTO: 0.5 %
EOSINOPHIL # BLD AUTO: 0.37 X10(3)/MCL (ref 0–0.9)
EOSINOPHIL NFR BLD AUTO: 6.6 %
ERYTHROCYTE [DISTWIDTH] IN BLOOD BY AUTOMATED COUNT: 16.6 % (ref 11.5–17)
FERRITIN SERPL-MCNC: 97.81 NG/ML (ref 4.63–204)
HCT VFR BLD AUTO: 37.5 % (ref 37–47)
HGB BLD-MCNC: 11.3 GM/DL (ref 12–16)
IMM GRANULOCYTES # BLD AUTO: 0.01 X10(3)/MCL (ref 0–0.02)
IMM GRANULOCYTES NFR BLD AUTO: 0.2 % (ref 0–0.43)
IRON SATN MFR SERPL: 20 % (ref 20–50)
IRON SERPL-MCNC: 56 UG/DL (ref 50–170)
LYMPHOCYTES # BLD AUTO: 1.43 X10(3)/MCL (ref 0.6–4.6)
LYMPHOCYTES NFR BLD AUTO: 25.5 %
MCH RBC QN AUTO: 24.4 PG (ref 27–31)
MCHC RBC AUTO-ENTMCNC: 30.1 MG/DL (ref 33–36)
MCV RBC AUTO: 81 FL (ref 80–94)
MONOCYTES # BLD AUTO: 0.37 X10(3)/MCL (ref 0.1–1.3)
MONOCYTES NFR BLD AUTO: 6.6 %
NEUTROPHILS # BLD AUTO: 3.4 X10(3)/MCL (ref 2.1–9.2)
NEUTROPHILS NFR BLD AUTO: 60.6 %
PLATELET # BLD AUTO: 179 X10(3)/MCL (ref 130–400)
PMV BLD AUTO: 11.3 FL (ref 9.4–12.4)
RBC # BLD AUTO: 4.63 X10(6)/MCL (ref 4.2–5.4)
RET# (OHS): 0.03 (ref 0.02–0.08)
RETICULOCYTE COUNT AUTOMATED (OLG): 0.64 % (ref 1.1–2.1)
TIBC SERPL-MCNC: 218 UG/DL (ref 70–310)
TIBC SERPL-MCNC: 274 UG/DL (ref 250–450)
WBC # SPEC AUTO: 5.6 X10(3)/MCL (ref 4.5–11.5)

## 2022-06-22 PROCEDURE — 83540 ASSAY OF IRON: CPT

## 2022-06-22 PROCEDURE — 99213 PR OFFICE/OUTPT VISIT, EST, LEVL III, 20-29 MIN: ICD-10-PCS | Mod: S$PBB,,, | Performed by: INTERNAL MEDICINE

## 2022-06-22 PROCEDURE — 85045 AUTOMATED RETICULOCYTE COUNT: CPT

## 2022-06-22 PROCEDURE — 99213 OFFICE O/P EST LOW 20 MIN: CPT | Mod: S$PBB,,, | Performed by: INTERNAL MEDICINE

## 2022-06-22 PROCEDURE — 99213 OFFICE O/P EST LOW 20 MIN: CPT | Mod: PBBFAC | Performed by: INTERNAL MEDICINE

## 2022-06-22 PROCEDURE — 99999 PR PBB SHADOW E&M-EST. PATIENT-LVL III: CPT | Mod: PBBFAC,,, | Performed by: INTERNAL MEDICINE

## 2022-06-22 PROCEDURE — 82728 ASSAY OF FERRITIN: CPT

## 2022-06-22 PROCEDURE — 85025 COMPLETE CBC W/AUTO DIFF WBC: CPT

## 2022-06-22 PROCEDURE — 99999 PR PBB SHADOW E&M-EST. PATIENT-LVL III: ICD-10-PCS | Mod: PBBFAC,,, | Performed by: INTERNAL MEDICINE

## 2022-06-22 PROCEDURE — 36415 COLL VENOUS BLD VENIPUNCTURE: CPT

## 2022-06-22 RX ORDER — FERROUS SULFATE 325(65) MG
325 TABLET ORAL 2 TIMES DAILY
COMMUNITY

## 2022-06-22 NOTE — PROGRESS NOTES
"PATIENT: Jenna Cuenca  MRN: [id number]  DATE: 6/22/2022        Chief Complaint: NPH for Anemia No complaints.     Hematologic History: Anemia        Subjective:   Interval History:  6/22/22 labs ordered at her initial evaluation CBC, CMP, Iron, Ferritin, Folate, B12, , CRP, EPO, Haptoglobin, Hgb elect,CEA  all normal.   Peripheral smear:  "hypochromic normocytic anemia" , Retic 0.77,   LDH slightly elevated at 291.  5/31/22 Ms. Cuenca is a 67 y.o. female who is seen today for initial apt for anemia, referred by Dr Alvarado. She resides at the [facility]. She is here today via ambulance on stretcher with EMS. She states that she ambulates via wheelchair at the [facility] and fed by staff. She is in good spirits and communicates well. She states that she has been there the past few years and enjoys it there. Denies easy bruising or bleeding. Patient had positive hemoccult test at NH. EGD was performed with normal result. Pending colonoscopy per PCP note.    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 No pertinent surgical " "history.    Family History: History reviewed. No pertinent family history.    Social History:  reports that she has never smoked. She has never used smokeless tobacco. She reports that she does not drink alcohol and does not use drugs.    Allergies:  Review of patient's allergies indicates:  No Known Allergies    Medications:  Current Outpatient Medications   Medication Sig Dispense Refill    calcium carbonate-vitamin D3 600 mg-10 mcg (400 unit) Cap Take 1 tablet by mouth Daily.      carvediloL (COREG) 12.5 MG tablet Take 12.5 mg by mouth 2 (two) times a day.      ferrous sulfate (FEOSOL) 325 mg (65 mg iron) Tab tablet Take 325 mg by mouth 2 (two) times daily.      vitamin B complex (B COMPLEX 1 ORAL) Take 1 tablet by mouth Daily.       No current facility-administered medications for this visit.       Review of Systems   Constitutional: Positive for activity change.   HENT: Negative.    Eyes: Negative.    Respiratory: Negative.    Cardiovascular: Negative.    Gastrointestinal: Negative.    Endocrine: Negative.    Genitourinary: Negative.    Musculoskeletal:        Body contracture   Skin: Negative.    Allergic/Immunologic: Negative.    Neurological: Positive for weakness.   Hematological: Negative.    Psychiatric/Behavioral: Negative.    All other systems reviewed and are negative.      ECOG Performance Status:   ECOG SCORE           Objective:      Vitals:   Vitals:    06/22/22 0859   BP: 114/74   BP Location: Right arm   Patient Position: Sitting   Pulse: 73   Resp: 18   Temp: 97.3 °F (36.3 °C)   SpO2: 100%   Height: 5' 8" (1.727 m)     BMI: There is no height or weight on file to calculate BMI.    Physical Exam  Vitals and nursing note reviewed.   Constitutional:       Appearance: Normal appearance.   HENT:      Head: Normocephalic and atraumatic.      Nose: Nose normal.   Eyes:      Conjunctiva/sclera: Conjunctivae normal.   Cardiovascular:      Rate and Rhythm: Normal rate and regular rhythm.      Pulses: " Normal pulses.      Heart sounds: Normal heart sounds.   Pulmonary:      Effort: Pulmonary effort is normal.      Breath sounds: Normal breath sounds.   Abdominal:      General: Abdomen is flat. There is no distension.      Tenderness: There is no abdominal tenderness.   Musculoskeletal:      Cervical back: No tenderness.      Comments: General body contractures   Neurological:      Mental Status: She is alert.      Motor: Weakness present.      Comments: Only able to ambulate with wheelchairs at NH.         Laboratory Data:  Lab Visit on 06/22/2022   Component Date Value Ref Range Status    Iron Binding Capacity Unsaturated 06/22/2022 218  70 - 310 ug/dL Final    Iron Level 06/22/2022 56  50 - 170 ug/dL Final    Iron Binding Capacity Total 06/22/2022 274  250 - 450 ug/dL Final    Iron Saturation 06/22/2022 20  20 - 50 % Final    Ferritin Level 06/22/2022 97.81  4.63 - 204.00 ng/mL Final    Retic Cnt Auto 06/22/2022 0.64 (A) 1.1 - 2.1 % Final    RET# 06/22/2022 0.0296  0.016 - 0.078 Final    WBC 06/22/2022 5.6  4.5 - 11.5 x10(3)/mcL Final    RBC 06/22/2022 4.63  4.20 - 5.40 x10(6)/mcL Final    Hgb 06/22/2022 11.3 (A) 12.0 - 16.0 gm/dL Final    Hct 06/22/2022 37.5  37.0 - 47.0 % Final    MCV 06/22/2022 81.0  80.0 - 94.0 fL Final    MCH 06/22/2022 24.4 (A) 27.0 - 31.0 pg Final    MCHC 06/22/2022 30.1 (A) 33.0 - 36.0 mg/dL Final    RDW 06/22/2022 16.6  11.5 - 17.0 % Final    Platelet 06/22/2022 179  130 - 400 x10(3)/mcL Final    MPV 06/22/2022 11.3  9.4 - 12.4 fL Final    Neut % 06/22/2022 60.6  % Final    Lymph % 06/22/2022 25.5  % Final    Mono % 06/22/2022 6.6  % Final    Eos % 06/22/2022 6.6  % Final    Basophil % 06/22/2022 0.5  % Final    Lymph # 06/22/2022 1.43  0.6 - 4.6 x10(3)/mcL Final    Neut # 06/22/2022 3.4  2.1 - 9.2 x10(3)/mcL Final    Mono # 06/22/2022 0.37  0.1 - 1.3 x10(3)/mcL Final    Eos # 06/22/2022 0.37  0 - 0.9 x10(3)/mcL Final    Baso # 06/22/2022 0.03  0 - 0.2  x10(3)/mcL Final    IG# 06/22/2022 0.01  0 - 0.0155 x10(3)/mcL Final    IG% 06/22/2022 0.2  0 - 0.43 % Final         Imaging:   Assessment:     1. Anemia: probably secondary to CRI. Creatinine is 1.33 erythropoietin level is 7.9.   No evidence of  nutrient deficiency, hemolysis, or thalassemia.    Started Ferrous sulfate 325 mg po BID as tolerated at her initial evaluttion  Might consider to start Procrit/Aranesp later if H/H is not improved with Ferritin >100.    2. CRI: F/U with Nephrologist/PCP          Plan:  Follow up in 1 weeks with MD via telemedicine to go over labs drawn today 6/22/22 to include CBC ferritin retic.   Continue Ferrous Sulfate 325mg po BID       Problem List Items Addressed This Visit    None     Visit Diagnoses     Anemia, unspecified type    -  Primary    Positive occult stool blood test        Chronic renal impairment, unspecified CKD stage        Protein-calorie malnutrition, unspecified severity         Other abnormal tumor markers

## 2022-07-11 ENCOUNTER — OFFICE VISIT (OUTPATIENT)
Dept: HEMATOLOGY/ONCOLOGY | Facility: CLINIC | Age: 67
End: 2022-07-11
Payer: MEDICARE

## 2022-07-11 DIAGNOSIS — E46 PROTEIN-CALORIE MALNUTRITION, UNSPECIFIED SEVERITY: ICD-10-CM

## 2022-07-11 DIAGNOSIS — N18.9 CHRONIC RENAL IMPAIRMENT, UNSPECIFIED CKD STAGE: ICD-10-CM

## 2022-07-11 DIAGNOSIS — D64.9 ANEMIA, UNSPECIFIED TYPE: Primary | ICD-10-CM

## 2022-07-11 DIAGNOSIS — R97.8 OTHER ABNORMAL TUMOR MARKERS: ICD-10-CM

## 2022-07-11 PROCEDURE — 99215 PR OFFICE/OUTPT VISIT, EST, LEVL V, 40-54 MIN: ICD-10-PCS | Mod: S$PBB,,, | Performed by: INTERNAL MEDICINE

## 2022-07-11 PROCEDURE — 99215 OFFICE O/P EST HI 40 MIN: CPT | Mod: S$PBB,,, | Performed by: INTERNAL MEDICINE

## 2022-07-11 PROCEDURE — 99999 PR PBB SHADOW E&M-EST. PATIENT-LVL II: CPT | Mod: PBBFAC,,, | Performed by: INTERNAL MEDICINE

## 2022-07-11 PROCEDURE — 99999 PR PBB SHADOW E&M-EST. PATIENT-LVL II: ICD-10-PCS | Mod: PBBFAC,,, | Performed by: INTERNAL MEDICINE

## 2022-07-11 PROCEDURE — 99212 OFFICE O/P EST SF 10 MIN: CPT | Mod: PBBFAC | Performed by: INTERNAL MEDICINE

## 2022-07-11 NOTE — PROGRESS NOTES
PATIENT: Jenna Cuenca  MRN: [id number]  DATE: 7/11/2022        Chief Complaint: Telemed visit from [facility]. No Complaints.     Hematologic History: Anemia        Subjective:   Interval History:   Ms. Cuenca is a 67 y.o. female who is seen today via telemdicine for anemia, referred by Dr Alvarado. She resides at the [facility]. She usually arrives via ambulance on stretcher with EMS. She states that she ambulates via wheelchair at the [facility] and fed by staff. She is in good spirits and communicates well. She states that she has been there the past few years and enjoys it there. Denies easy bruising or bleeding. Patient had positive hemoccult test at NH. EGD was performed with normal result. Pending colonoscopy per PCP note.    Doing well since last visit. No new complaints.    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 No pertinent surgical history.    Family History: History reviewed. No pertinent family history.    Social History:  reports that she has never smoked. She has never used smokeless tobacco. She reports that  she does not drink alcohol and does not use drugs.    Allergies:  Review of patient's allergies indicates:  No Known Allergies    Medications:  Current Outpatient Medications   Medication Sig Dispense Refill    calcium carbonate-vitamin D3 600 mg-10 mcg (400 unit) Cap Take 1 tablet by mouth Daily.      carvediloL (COREG) 12.5 MG tablet Take 12.5 mg by mouth 2 (two) times a day.      ferrous sulfate (FEOSOL) 325 mg (65 mg iron) Tab tablet Take 325 mg by mouth 2 (two) times daily.      vitamin B complex (B COMPLEX 1 ORAL) Take 1 tablet by mouth Daily.       No current facility-administered medications for this visit.       Review of Systems   Constitutional: Positive for activity change.   HENT: Negative.    Eyes: Negative.    Respiratory: Negative.    Cardiovascular: Negative.    Gastrointestinal: Negative.    Endocrine: Negative.    Genitourinary: Negative.    Musculoskeletal:        Body contracture   Skin: Negative.    Allergic/Immunologic: Negative.    Neurological: Positive for weakness.   Hematological: Negative.    Psychiatric/Behavioral: Negative.    All other systems reviewed and are negative.      ECOG Performance Status:   ECOG SCORE           Objective:      Vitals:   There were no vitals filed for this visit.  BMI: There is no height or weight on file to calculate BMI.    Physical Exam:  Deferred (Telemedicine)    Laboratory Data:  No visits with results within 1 Week(s) from this visit.   Latest known visit with results is:   Lab Visit on 06/22/2022   Component Date Value Ref Range Status    Iron Binding Capacity Unsaturated 06/22/2022 218  70 - 310 ug/dL Final    Iron Level 06/22/2022 56  50 - 170 ug/dL Final    Iron Binding Capacity Total 06/22/2022 274  250 - 450 ug/dL Final    Iron Saturation 06/22/2022 20  20 - 50 % Final    Ferritin Level 06/22/2022 97.81  4.63 - 204.00 ng/mL Final    Retic Cnt Auto 06/22/2022 0.64 (A) 1.1 - 2.1 % Final    RET# 06/22/2022 0.0296  0.016 - 0.078 Final     "WBC 06/22/2022 5.6  4.5 - 11.5 x10(3)/mcL Final    RBC 06/22/2022 4.63  4.20 - 5.40 x10(6)/mcL Final    Hgb 06/22/2022 11.3 (A) 12.0 - 16.0 gm/dL Final    Hct 06/22/2022 37.5  37.0 - 47.0 % Final    MCV 06/22/2022 81.0  80.0 - 94.0 fL Final    MCH 06/22/2022 24.4 (A) 27.0 - 31.0 pg Final    MCHC 06/22/2022 30.1 (A) 33.0 - 36.0 mg/dL Final    RDW 06/22/2022 16.6  11.5 - 17.0 % Final    Platelet 06/22/2022 179  130 - 400 x10(3)/mcL Final    MPV 06/22/2022 11.3  9.4 - 12.4 fL Final    Neut % 06/22/2022 60.6  % Final    Lymph % 06/22/2022 25.5  % Final    Mono % 06/22/2022 6.6  % Final    Eos % 06/22/2022 6.6  % Final    Basophil % 06/22/2022 0.5  % Final    Lymph # 06/22/2022 1.43  0.6 - 4.6 x10(3)/mcL Final    Neut # 06/22/2022 3.4  2.1 - 9.2 x10(3)/mcL Final    Mono # 06/22/2022 0.37  0.1 - 1.3 x10(3)/mcL Final    Eos # 06/22/2022 0.37  0 - 0.9 x10(3)/mcL Final    Baso # 06/22/2022 0.03  0 - 0.2 x10(3)/mcL Final    IG# 06/22/2022 0.01  0 - 0.0155 x10(3)/mcL Final    IG% 06/22/2022 0.2  0 - 0.43 % Final     6/22/22 labs ordered at her initial evaluation CBC, CMP, Iron, Ferritin, Folate, B12, , CRP, EPO, Haptoglobin, Hgb elect,CEA  all normal.   Peripheral smear:  "hypochromic normocytic anemia" , Retic 0.77,   LDH slightly elevated at 291.    Imaging:   Assessment:     1. Iron deficiency Anemia: improved H/H   Continue Ferrous sulfate 325 mg po BID   No evidence of  nutrient deficiency, hemolysis, or thalassemia.    2. Anemia of CRI: improved H/H  Creatinine is 1.33 erythropoietin level is 7.9.   Might consider to start Procrit/Aranesp later if Hb less than 11 with Ferritin >100.    2. CRI: F/U with Nephrologist/PCP  - Dr Alvarado states that she does not need to follow up with Nephrologist due to normal creatine level.  She will continue to follow up with PCP as PRN          Plan:  Follow up in 3 months with NP via telemedicine to go over labs drawn by NH 1 week prior  CBC, CMP, Iron, Ferritin, " Folate, B12  Continue Ferrous Sulfate 325mg po BID    [facility] will call to get colonoscopy rescheduled.        Problem List Items Addressed This Visit    None

## 2022-07-21 ENCOUNTER — LAB REQUISITION (OUTPATIENT)
Dept: LAB | Facility: HOSPITAL | Age: 67
End: 2022-07-21
Payer: MEDICARE

## 2022-07-21 DIAGNOSIS — N18.30 CHRONIC KIDNEY DISEASE, STAGE 3 UNSPECIFIED: ICD-10-CM

## 2022-07-21 LAB
ANION GAP SERPL CALC-SCNC: 11 MEQ/L
BASOPHILS # BLD AUTO: 0.04 X10(3)/MCL (ref 0–0.2)
BASOPHILS NFR BLD AUTO: 0.7 %
BUN SERPL-MCNC: 43 MG/DL (ref 9.8–20.1)
CALCIUM SERPL-MCNC: 9.2 MG/DL (ref 8.4–10.2)
CHLORIDE SERPL-SCNC: 106 MMOL/L (ref 98–107)
CO2 SERPL-SCNC: 27 MMOL/L (ref 23–31)
CREAT SERPL-MCNC: 1.22 MG/DL (ref 0.55–1.02)
CREAT/UREA NIT SERPL: 35
EOSINOPHIL # BLD AUTO: 0.39 X10(3)/MCL (ref 0–0.9)
EOSINOPHIL NFR BLD AUTO: 7.3 %
ERYTHROCYTE [DISTWIDTH] IN BLOOD BY AUTOMATED COUNT: 14.6 % (ref 11.5–17)
GLUCOSE SERPL-MCNC: 80 MG/DL (ref 82–115)
HCT VFR BLD AUTO: 34.3 % (ref 37–47)
HGB BLD-MCNC: 10.8 GM/DL (ref 12–16)
IMM GRANULOCYTES # BLD AUTO: 0.01 X10(3)/MCL (ref 0–0.04)
IMM GRANULOCYTES NFR BLD AUTO: 0.2 %
LYMPHOCYTES # BLD AUTO: 1.98 X10(3)/MCL (ref 0.6–4.6)
LYMPHOCYTES NFR BLD AUTO: 37 %
MCH RBC QN AUTO: 25.4 PG (ref 27–31)
MCHC RBC AUTO-ENTMCNC: 31.5 MG/DL (ref 33–36)
MCV RBC AUTO: 80.7 FL (ref 80–94)
MONOCYTES # BLD AUTO: 0.52 X10(3)/MCL (ref 0.1–1.3)
MONOCYTES NFR BLD AUTO: 9.7 %
NEUTROPHILS # BLD AUTO: 2.4 X10(3)/MCL (ref 2.1–9.2)
NEUTROPHILS NFR BLD AUTO: 45.1 %
NRBC BLD AUTO-RTO: 0 %
PLATELET # BLD AUTO: 115 X10(3)/MCL (ref 130–400)
PLATELETS.RETICULATED NFR BLD AUTO: 9.1 % (ref 0.9–11.2)
PMV BLD AUTO: 12.2 FL (ref 7.4–10.4)
POTASSIUM SERPL-SCNC: 4.5 MMOL/L (ref 3.5–5.1)
RBC # BLD AUTO: 4.25 X10(6)/MCL (ref 4.2–5.4)
SODIUM SERPL-SCNC: 144 MMOL/L (ref 136–145)
TSH SERPL-ACNC: 1.68 UIU/ML (ref 0.35–4.94)
WBC # SPEC AUTO: 5.4 X10(3)/MCL (ref 4.5–11.5)

## 2022-07-21 PROCEDURE — 80048 BASIC METABOLIC PNL TOTAL CA: CPT | Performed by: FAMILY MEDICINE

## 2022-07-21 PROCEDURE — 85025 COMPLETE CBC W/AUTO DIFF WBC: CPT | Performed by: FAMILY MEDICINE

## 2022-07-21 PROCEDURE — 84443 ASSAY THYROID STIM HORMONE: CPT | Performed by: FAMILY MEDICINE

## 2022-08-10 ENCOUNTER — LAB REQUISITION (OUTPATIENT)
Dept: LAB | Facility: HOSPITAL | Age: 67
End: 2022-08-10
Payer: MEDICARE

## 2022-08-10 DIAGNOSIS — D64.9 ANEMIA, UNSPECIFIED: ICD-10-CM

## 2022-08-10 LAB
BASOPHILS # BLD AUTO: 0.03 X10(3)/MCL (ref 0–0.2)
BASOPHILS NFR BLD AUTO: 0.6 %
EOSINOPHIL # BLD AUTO: 0.38 X10(3)/MCL (ref 0–0.9)
EOSINOPHIL NFR BLD AUTO: 7.5 %
ERYTHROCYTE [DISTWIDTH] IN BLOOD BY AUTOMATED COUNT: 14.3 % (ref 11.5–17)
HCT VFR BLD AUTO: 35 % (ref 37–47)
HEMOCCULT SP1 STL QL: NEGATIVE
HGB BLD-MCNC: 11.1 GM/DL (ref 12–16)
IMM GRANULOCYTES # BLD AUTO: 0.01 X10(3)/MCL (ref 0–0.04)
IMM GRANULOCYTES NFR BLD AUTO: 0.2 %
LYMPHOCYTES # BLD AUTO: 1.84 X10(3)/MCL (ref 0.6–4.6)
LYMPHOCYTES NFR BLD AUTO: 36.5 %
MCH RBC QN AUTO: 25 PG (ref 27–31)
MCHC RBC AUTO-ENTMCNC: 31.7 MG/DL (ref 33–36)
MCV RBC AUTO: 78.8 FL (ref 80–94)
MONOCYTES # BLD AUTO: 0.49 X10(3)/MCL (ref 0.1–1.3)
MONOCYTES NFR BLD AUTO: 9.7 %
NEUTROPHILS # BLD AUTO: 2.3 X10(3)/MCL (ref 2.1–9.2)
NEUTROPHILS NFR BLD AUTO: 45.5 %
NRBC BLD AUTO-RTO: 0 %
PLATELET # BLD AUTO: 90 X10(3)/MCL (ref 130–400)
PMV BLD AUTO: 11.9 FL (ref 7.4–10.4)
RBC # BLD AUTO: 4.44 X10(6)/MCL (ref 4.2–5.4)
WBC # SPEC AUTO: 5 X10(3)/MCL (ref 4.5–11.5)

## 2022-08-10 PROCEDURE — 82270 OCCULT BLOOD FECES: CPT | Performed by: FAMILY MEDICINE

## 2022-08-10 PROCEDURE — 85025 COMPLETE CBC W/AUTO DIFF WBC: CPT | Performed by: FAMILY MEDICINE

## 2022-08-11 PROCEDURE — 82270 OCCULT BLOOD FECES: CPT | Performed by: FAMILY MEDICINE

## 2022-08-12 ENCOUNTER — LAB REQUISITION (OUTPATIENT)
Dept: LAB | Facility: HOSPITAL | Age: 67
End: 2022-08-12
Payer: MEDICARE

## 2022-08-12 DIAGNOSIS — D64.9 ANEMIA, UNSPECIFIED: ICD-10-CM

## 2022-08-12 LAB — HEMOCCULT SP1 STL QL: NEGATIVE

## 2022-08-14 ENCOUNTER — LAB REQUISITION (OUTPATIENT)
Dept: LAB | Facility: HOSPITAL | Age: 67
End: 2022-08-14
Payer: MEDICARE

## 2022-08-14 DIAGNOSIS — D64.9 ANEMIA, UNSPECIFIED: ICD-10-CM

## 2022-08-14 LAB
COLOR STL: NORMAL
CONSISTENCY STL: NORMAL
HEMOCCULT SP1 STL QL: NEGATIVE

## 2022-08-14 PROCEDURE — 82270 OCCULT BLOOD FECES: CPT | Performed by: FAMILY MEDICINE

## 2022-10-03 ENCOUNTER — LAB REQUISITION (OUTPATIENT)
Dept: LAB | Facility: HOSPITAL | Age: 67
End: 2022-10-03
Payer: MEDICARE

## 2022-10-03 DIAGNOSIS — N18.30 CHRONIC KIDNEY DISEASE, STAGE 3 UNSPECIFIED: ICD-10-CM

## 2022-10-03 DIAGNOSIS — E56.9 VITAMIN DEFICIENCY, UNSPECIFIED: ICD-10-CM

## 2022-10-03 DIAGNOSIS — I10 ESSENTIAL (PRIMARY) HYPERTENSION: ICD-10-CM

## 2022-10-03 LAB
ALBUMIN SERPL-MCNC: 3.1 GM/DL (ref 3.4–4.8)
ALBUMIN/GLOB SERPL: 1.1 RATIO (ref 1.1–2)
ALP SERPL-CCNC: 72 UNIT/L (ref 40–150)
ALT SERPL-CCNC: 27 UNIT/L (ref 0–55)
AST SERPL-CCNC: 17 UNIT/L (ref 5–34)
BASOPHILS # BLD AUTO: 0.03 X10(3)/MCL (ref 0–0.2)
BASOPHILS NFR BLD AUTO: 0.5 %
BILIRUBIN DIRECT+TOT PNL SERPL-MCNC: 0.3 MG/DL
BUN SERPL-MCNC: 42.9 MG/DL (ref 9.8–20.1)
CALCIUM SERPL-MCNC: 9.7 MG/DL (ref 8.4–10.2)
CHLORIDE SERPL-SCNC: 108 MMOL/L (ref 98–107)
CO2 SERPL-SCNC: 30 MMOL/L (ref 23–31)
CREAT SERPL-MCNC: 1 MG/DL (ref 0.55–1.02)
EOSINOPHIL # BLD AUTO: 0.33 X10(3)/MCL (ref 0–0.9)
EOSINOPHIL NFR BLD AUTO: 5.7 %
ERYTHROCYTE [DISTWIDTH] IN BLOOD BY AUTOMATED COUNT: 14.2 % (ref 11.5–17)
FERRITIN SERPL-MCNC: 93.63 NG/ML (ref 4.63–204)
FOLATE SERPL-MCNC: 10.8 NG/ML (ref 7–31.4)
GFR SERPLBLD CREATININE-BSD FMLA CKD-EPI: >60 MLS/MIN/1.73/M2
GLOBULIN SER-MCNC: 2.8 GM/DL (ref 2.4–3.5)
GLUCOSE SERPL-MCNC: 85 MG/DL (ref 82–115)
HCT VFR BLD AUTO: 34 % (ref 37–47)
HGB BLD-MCNC: 10.6 GM/DL (ref 12–16)
IMM GRANULOCYTES # BLD AUTO: 0.01 X10(3)/MCL (ref 0–0.04)
IMM GRANULOCYTES NFR BLD AUTO: 0.2 %
IRON SATN MFR SERPL: 21 % (ref 20–50)
IRON SERPL-MCNC: 48 UG/DL (ref 50–170)
LYMPHOCYTES # BLD AUTO: 1.88 X10(3)/MCL (ref 0.6–4.6)
LYMPHOCYTES NFR BLD AUTO: 32.4 %
MCH RBC QN AUTO: 24.8 PG (ref 27–31)
MCHC RBC AUTO-ENTMCNC: 31.2 MG/DL (ref 33–36)
MCV RBC AUTO: 79.4 FL (ref 80–94)
MONOCYTES # BLD AUTO: 0.55 X10(3)/MCL (ref 0.1–1.3)
MONOCYTES NFR BLD AUTO: 9.5 %
NEUTROPHILS # BLD AUTO: 3 X10(3)/MCL (ref 2.1–9.2)
NEUTROPHILS NFR BLD AUTO: 51.7 %
NRBC BLD AUTO-RTO: 0 %
PLATELET # BLD AUTO: 206 X10(3)/MCL (ref 130–400)
PMV BLD AUTO: 12.1 FL (ref 7.4–10.4)
POTASSIUM SERPL-SCNC: 4.6 MMOL/L (ref 3.5–5.1)
PROT SERPL-MCNC: 5.9 GM/DL (ref 5.8–7.6)
RBC # BLD AUTO: 4.28 X10(6)/MCL (ref 4.2–5.4)
SODIUM SERPL-SCNC: 145 MMOL/L (ref 136–145)
TIBC SERPL-MCNC: 178 UG/DL (ref 70–310)
TIBC SERPL-MCNC: 226 UG/DL (ref 250–450)
VIT B12 SERPL-MCNC: >2000 PG/ML (ref 213–816)
WBC # SPEC AUTO: 5.8 X10(3)/MCL (ref 4.5–11.5)

## 2022-10-03 PROCEDURE — 80053 COMPREHEN METABOLIC PANEL: CPT | Performed by: FAMILY MEDICINE

## 2022-10-03 PROCEDURE — 82746 ASSAY OF FOLIC ACID SERUM: CPT | Performed by: FAMILY MEDICINE

## 2022-10-03 PROCEDURE — 82607 VITAMIN B-12: CPT | Performed by: FAMILY MEDICINE

## 2022-10-03 PROCEDURE — 82728 ASSAY OF FERRITIN: CPT | Performed by: FAMILY MEDICINE

## 2022-10-03 PROCEDURE — 85025 COMPLETE CBC W/AUTO DIFF WBC: CPT | Performed by: FAMILY MEDICINE

## 2022-10-03 PROCEDURE — 83540 ASSAY OF IRON: CPT | Performed by: FAMILY MEDICINE

## 2022-10-11 ENCOUNTER — OFFICE VISIT (OUTPATIENT)
Dept: HEMATOLOGY/ONCOLOGY | Facility: CLINIC | Age: 67
End: 2022-10-11
Payer: MEDICARE

## 2022-10-11 DIAGNOSIS — D64.9 ANEMIA, UNSPECIFIED TYPE: Primary | ICD-10-CM

## 2022-10-11 DIAGNOSIS — N18.9 CHRONIC RENAL IMPAIRMENT, UNSPECIFIED CKD STAGE: ICD-10-CM

## 2022-10-11 PROCEDURE — 99442 PR PHYSICIAN TELEPHONE EVALUATION 11-20 MIN: ICD-10-PCS | Mod: 95,,, | Performed by: NURSE PRACTITIONER

## 2022-10-11 PROCEDURE — 99442 PR PHYSICIAN TELEPHONE EVALUATION 11-20 MIN: CPT | Mod: 95,,, | Performed by: NURSE PRACTITIONER

## 2022-10-11 NOTE — PROGRESS NOTES
PATIENT: Jenna Cuenca  MRN: [id number]  DATE: 10/11/2022     Established Patient - Audio Only Telehealth Visit     The patient location is: [facility]  Visit type: Virtual visit with audio only (telephone)  Total time spent with patient: 20 mins      The reason for the audio only service rather than synchronous audio and video virtual visit was related to technical difficulties or patient preference/necessity.     Each patient to whom I provide medical services by telemedicine is:  (1) informed of the relationship between the physician and patient and the respective role of any other health care provider with respect to management of the patient; and (2) notified that they may decline to receive medical services by telemedicine and may withdraw from such care at any time. Patient verbally consented to receive this service via voice-only telephone call.    This service was not originating from a related E/M service provided within the previous 7 days nor will  to an E/M service or procedure within the next 24 hours or my soonest available appointment.  Prevailing standard of care was able to be met in this audio-only visit.        Chief Complaint: Telemed visit from [facility]. No Complaints.     Hematologic History: Anemia        Subjective:   Interval History:   Ms. Cuenca is a 67 y.o. female who is seen today via telemdicine for anemia, referred by Dr Alvarado. She resides at the [facility]. She usually arrives via ambulance on stretcher with EMS. She states that she ambulates via wheelchair at the correction and fed by staff. She is in good spirits and communicates well. She initially had positive hemoccult test at NH. EGD was performed with normal result.  She was slated to undergo colonoscopy last month with Dr. Otoniel Bridges but was later deferred due to her improvement in CBC and negative blood stool sample. She denies easy bruising or bleeding.     Doing well since last visit. No new  complaints.    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    Family History: History reviewed. No pertinent family history.    Social History:  reports that she has never smoked. She has never used smokeless tobacco. She reports that she does not drink alcohol and does not use drugs.    Allergies:  Review of patient's allergies indicates:  No Known Allergies    Medications:  Current Outpatient Medications   Medication Sig Dispense Refill    calcium carbonate-vitamin D3 600 mg-10 mcg (400 unit) Cap Take 1 tablet by mouth Daily.      carvediloL (COREG) 12.5 MG tablet Take 6.25 mg by mouth 2 (two) times a day.      ferrous sulfate (FEOSOL) 325 mg (65 mg iron) Tab tablet Take 325 mg by mouth 2 (two) times daily.      vitamin B complex (B COMPLEX 1 ORAL) Take 1 tablet by mouth Daily.       No current facility-administered medications for this visit.       Review of Systems   Constitutional:  Positive for activity change.   HENT: Negative.     Eyes: Negative.    Respiratory: Negative.     Cardiovascular: Negative.    Gastrointestinal: Negative.     Endocrine: Negative.    Genitourinary: Negative.    Musculoskeletal:         Body contracture   Skin: Negative.    Allergic/Immunologic: Negative.    Neurological:  Positive for weakness.   Hematological: Negative.    Psychiatric/Behavioral: Negative.     All other systems reviewed and are negative.        Objective:      Vitals:   There were no vitals filed for this visit.  BMI: There is no height or weight on file to calculate BMI.    Physical Exam:  Deferred (Telemedicine)    Laboratory Data:  No visits with results within 1 Week(s) from this visit.   Latest known visit with results is:   Lab Requisition on 10/03/2022   Component Date Value Ref Range Status    Sodium Level 10/03/2022 145  136 - 145 mmol/L Final    Potassium Level 10/03/2022 4.6  3.5 - 5.1 mmol/L Final    Chloride 10/03/2022 108 (H)  98 - 107 mmol/L Final    Carbon Dioxide 10/03/2022 30  23 - 31 mmol/L Final    Glucose Level 10/03/2022 85  82 - 115 mg/dL Final    Blood Urea Nitrogen 10/03/2022 42.9 (H)  9.8 - 20.1 mg/dL Final    Creatinine 10/03/2022 1.00  0.55 - 1.02 mg/dL Final    Calcium Level Total 10/03/2022 9.7  8.4 - 10.2 mg/dL Final    Protein Total 10/03/2022 5.9  5.8 - 7.6 gm/dL Final    Albumin Level 10/03/2022 3.1 (L)  3.4 - 4.8 gm/dL Final    Globulin 10/03/2022 2.8  2.4 - 3.5 gm/dL Final    Albumin/Globulin Ratio 10/03/2022 1.1  1.1 - 2.0 ratio Final    Bilirubin Total 10/03/2022 0.3  <=1.5 mg/dL Final    Alkaline Phosphatase 10/03/2022 72  40 - 150 unit/L Final    Alanine Aminotransferase 10/03/2022 27  0 - 55 unit/L Final    Aspartate Aminotransferase 10/03/2022 17  5 - 34 unit/L Final    eGFR 10/03/2022 >60  mls/min/1.73/m2 Final    Ferritin Level 10/03/2022 93.63  4.63 - 204.00 ng/mL Final    Folate Level 10/03/2022 10.8  7.0 - 31.4 ng/mL Final    Iron Binding Capacity Unsaturated 10/03/2022 178  70 - 310 ug/dL Final    Iron Level 10/03/2022 48 (L)  50 - 170 ug/dL Final    Iron Binding Capacity Total 10/03/2022 226 (L)  250 -  "450 ug/dL Final    Iron Saturation 10/03/2022 21  20 - 50 % Final    Vitamin B12 Level 10/03/2022 >2,000 (H)  213 - 816 pg/mL Final    WBC 10/03/2022 5.8  4.5 - 11.5 x10(3)/mcL Final    RBC 10/03/2022 4.28  4.20 - 5.40 x10(6)/mcL Final    Hgb 10/03/2022 10.6 (L)  12.0 - 16.0 gm/dL Final    Hct 10/03/2022 34.0 (L)  37.0 - 47.0 % Final    MCV 10/03/2022 79.4 (L)  80.0 - 94.0 fL Final    MCH 10/03/2022 24.8 (L)  27.0 - 31.0 pg Final    MCHC 10/03/2022 31.2 (L)  33.0 - 36.0 mg/dL Final    RDW 10/03/2022 14.2  11.5 - 17.0 % Final    Platelet 10/03/2022 206  130 - 400 x10(3)/mcL Final    MPV 10/03/2022 12.1 (H)  7.4 - 10.4 fL Final    Neut % 10/03/2022 51.7  % Final    Lymph % 10/03/2022 32.4  % Final    Mono % 10/03/2022 9.5  % Final    Eos % 10/03/2022 5.7  % Final    Basophil % 10/03/2022 0.5  % Final    Lymph # 10/03/2022 1.88  0.6 - 4.6 x10(3)/mcL Final    Neut # 10/03/2022 3.0  2.1 - 9.2 x10(3)/mcL Final    Mono # 10/03/2022 0.55  0.1 - 1.3 x10(3)/mcL Final    Eos # 10/03/2022 0.33  0 - 0.9 x10(3)/mcL Final    Baso # 10/03/2022 0.03  0 - 0.2 x10(3)/mcL Final    IG# 10/03/2022 0.01  0 - 0.04 x10(3)/mcL Final    IG% 10/03/2022 0.2  % Final    NRBC% 10/03/2022 0.0  % Final     6/22/22 labs ordered at her initial evaluation CBC, CMP, Iron, Ferritin, Folate, B12, , CRP, EPO, Haptoglobin, Hgb elect,CEA  all normal.   Peripheral smear:  "hypochromic normocytic anemia" , Retic 0.77,   LDH slightly elevated at 291.    Imaging:   Assessment:     1. Iron deficiency Anemia: Stable H/H   Continue Ferrous sulfate 325 mg po BID   No evidence of  nutrient deficiency, hemolysis, or thalassemia.    2. Anemia of CRI: improved H/H  Creatinine is 1.0 erythropoietin level is 7.9.   Might consider to start Procrit/Aranesp later if Hb less than 11 with Ferritin >100.    2. CRI: F/U with Nephrologist/PCP  - S/P  Left ureteroscopic stone extraction of a distal left ureteral calculus and ureteroscopy with laser lithotripsy of a more proximal " ureteral calculus and indwelling stent March 2022. Dr Alvarado states that she does not need to follow up with Nephrologist due to normal creatine level.  She will continue to follow up with PCP as PRN          Plan:  Follow up in 3 months with MD via telemedicine to go over labs drawn by NH 1 week prior  CBC, CMP, Iron, Ferritin, Folate, B12  Continue Ferrous Sulfate 325mg po BID

## 2022-10-17 ENCOUNTER — LAB REQUISITION (OUTPATIENT)
Dept: LAB | Facility: HOSPITAL | Age: 67
End: 2022-10-17
Payer: MEDICARE

## 2022-10-17 DIAGNOSIS — E87.5 HYPERKALEMIA: ICD-10-CM

## 2022-10-17 LAB
ALBUMIN SERPL-MCNC: 3.2 GM/DL (ref 3.4–4.8)
ALBUMIN/GLOB SERPL: 1.2 RATIO (ref 1.1–2)
ALP SERPL-CCNC: 56 UNIT/L (ref 40–150)
ALT SERPL-CCNC: 15 UNIT/L (ref 0–55)
AST SERPL-CCNC: 16 UNIT/L (ref 5–34)
BASOPHILS # BLD AUTO: 0.03 X10(3)/MCL (ref 0–0.2)
BASOPHILS NFR BLD AUTO: 0.6 %
BILIRUBIN DIRECT+TOT PNL SERPL-MCNC: 0.4 MG/DL
BUN SERPL-MCNC: 37.4 MG/DL (ref 9.8–20.1)
CALCIUM SERPL-MCNC: 9.6 MG/DL (ref 8.4–10.2)
CHLORIDE SERPL-SCNC: 108 MMOL/L (ref 98–107)
CO2 SERPL-SCNC: 28 MMOL/L (ref 23–31)
CREAT SERPL-MCNC: 0.91 MG/DL (ref 0.55–1.02)
EOSINOPHIL # BLD AUTO: 0.28 X10(3)/MCL (ref 0–0.9)
EOSINOPHIL NFR BLD AUTO: 5.8 %
ERYTHROCYTE [DISTWIDTH] IN BLOOD BY AUTOMATED COUNT: 14.5 % (ref 11.5–17)
GFR SERPLBLD CREATININE-BSD FMLA CKD-EPI: >60 MLS/MIN/1.73/M2
GLOBULIN SER-MCNC: 2.7 GM/DL (ref 2.4–3.5)
GLUCOSE SERPL-MCNC: 75 MG/DL (ref 82–115)
HCT VFR BLD AUTO: 33.2 % (ref 37–47)
HGB BLD-MCNC: 10.5 GM/DL (ref 12–16)
IMM GRANULOCYTES # BLD AUTO: 0 X10(3)/MCL (ref 0–0.04)
IMM GRANULOCYTES NFR BLD AUTO: 0 %
LYMPHOCYTES # BLD AUTO: 1.92 X10(3)/MCL (ref 0.6–4.6)
LYMPHOCYTES NFR BLD AUTO: 39.9 %
MCH RBC QN AUTO: 25.4 PG (ref 27–31)
MCHC RBC AUTO-ENTMCNC: 31.6 MG/DL (ref 33–36)
MCV RBC AUTO: 80.2 FL (ref 80–94)
MONOCYTES # BLD AUTO: 0.41 X10(3)/MCL (ref 0.1–1.3)
MONOCYTES NFR BLD AUTO: 8.5 %
NEUTROPHILS # BLD AUTO: 2.2 X10(3)/MCL (ref 2.1–9.2)
NEUTROPHILS NFR BLD AUTO: 45.2 %
NRBC BLD AUTO-RTO: 0 %
PLATELET # BLD AUTO: 114 X10(3)/MCL (ref 130–400)
PMV BLD AUTO: 12.6 FL (ref 7.4–10.4)
POTASSIUM SERPL-SCNC: 4.3 MMOL/L (ref 3.5–5.1)
PROT SERPL-MCNC: 5.9 GM/DL (ref 5.8–7.6)
RBC # BLD AUTO: 4.14 X10(6)/MCL (ref 4.2–5.4)
SODIUM SERPL-SCNC: 143 MMOL/L (ref 136–145)
WBC # SPEC AUTO: 4.8 X10(3)/MCL (ref 4.5–11.5)

## 2022-10-17 PROCEDURE — 85025 COMPLETE CBC W/AUTO DIFF WBC: CPT | Performed by: FAMILY MEDICINE

## 2022-10-17 PROCEDURE — 80053 COMPREHEN METABOLIC PANEL: CPT | Performed by: FAMILY MEDICINE

## 2023-01-17 ENCOUNTER — LAB REQUISITION (OUTPATIENT)
Dept: LAB | Facility: HOSPITAL | Age: 68
End: 2023-01-17
Payer: MEDICARE

## 2023-01-17 DIAGNOSIS — I10 ESSENTIAL (PRIMARY) HYPERTENSION: ICD-10-CM

## 2023-01-17 DIAGNOSIS — E87.5 HYPERKALEMIA: ICD-10-CM

## 2023-01-17 LAB
ANION GAP SERPL CALC-SCNC: 10 MEQ/L
BASOPHILS # BLD AUTO: 0.04 X10(3)/MCL (ref 0–0.2)
BASOPHILS NFR BLD AUTO: 0.7 %
BUN SERPL-MCNC: 38.4 MG/DL (ref 9.8–20.1)
CALCIUM SERPL-MCNC: 9.4 MG/DL (ref 8.4–10.2)
CHLORIDE SERPL-SCNC: 109 MMOL/L (ref 98–107)
CO2 SERPL-SCNC: 24 MMOL/L (ref 23–31)
CREAT SERPL-MCNC: 1.06 MG/DL (ref 0.55–1.02)
CREAT/UREA NIT SERPL: 36
EOSINOPHIL # BLD AUTO: 0.45 X10(3)/MCL (ref 0–0.9)
EOSINOPHIL NFR BLD AUTO: 8.2 %
ERYTHROCYTE [DISTWIDTH] IN BLOOD BY AUTOMATED COUNT: 13.6 % (ref 11.5–17)
GFR SERPLBLD CREATININE-BSD FMLA CKD-EPI: 58 MLS/MIN/1.73/M2
GLUCOSE SERPL-MCNC: 82 MG/DL (ref 82–115)
HCT VFR BLD AUTO: 37.1 % (ref 37–47)
HGB BLD-MCNC: 11.9 GM/DL (ref 12–16)
IMM GRANULOCYTES # BLD AUTO: 0.01 X10(3)/MCL (ref 0–0.04)
IMM GRANULOCYTES NFR BLD AUTO: 0.2 %
LYMPHOCYTES # BLD AUTO: 1.76 X10(3)/MCL (ref 0.6–4.6)
LYMPHOCYTES NFR BLD AUTO: 32 %
MCH RBC QN AUTO: 25.8 PG
MCHC RBC AUTO-ENTMCNC: 32.1 MG/DL (ref 33–36)
MCV RBC AUTO: 80.5 FL (ref 80–94)
MONOCYTES # BLD AUTO: 0.46 X10(3)/MCL (ref 0.1–1.3)
MONOCYTES NFR BLD AUTO: 8.4 %
NEUTROPHILS # BLD AUTO: 2.78 X10(3)/MCL (ref 2.1–9.2)
NEUTROPHILS NFR BLD AUTO: 50.5 %
NRBC BLD AUTO-RTO: 0 %
PLATELET # BLD AUTO: 125 X10(3)/MCL (ref 130–400)
PMV BLD AUTO: 11.5 FL (ref 7.4–10.4)
POTASSIUM SERPL-SCNC: 4.3 MMOL/L (ref 3.5–5.1)
RBC # BLD AUTO: 4.61 X10(6)/MCL (ref 4.2–5.4)
SODIUM SERPL-SCNC: 143 MMOL/L (ref 136–145)
WBC # SPEC AUTO: 5.5 X10(3)/MCL (ref 4.5–11.5)

## 2023-01-17 PROCEDURE — 80048 BASIC METABOLIC PNL TOTAL CA: CPT | Performed by: FAMILY MEDICINE

## 2023-01-17 PROCEDURE — 85025 COMPLETE CBC W/AUTO DIFF WBC: CPT | Performed by: FAMILY MEDICINE

## 2023-01-27 ENCOUNTER — LAB REQUISITION (OUTPATIENT)
Dept: LAB | Facility: HOSPITAL | Age: 68
End: 2023-01-27
Payer: MEDICARE

## 2023-01-27 DIAGNOSIS — D64.9 ANEMIA, UNSPECIFIED: ICD-10-CM

## 2023-01-27 LAB
ALBUMIN SERPL-MCNC: 3.4 G/DL (ref 3.4–4.8)
ALBUMIN/GLOB SERPL: 1.2 RATIO (ref 1.1–2)
ALP SERPL-CCNC: 63 UNIT/L (ref 40–150)
ALT SERPL-CCNC: 12 UNIT/L (ref 0–55)
AST SERPL-CCNC: 17 UNIT/L (ref 5–34)
BILIRUBIN DIRECT+TOT PNL SERPL-MCNC: 0.3 MG/DL
BUN SERPL-MCNC: 40 MG/DL (ref 9.8–20.1)
CALCIUM SERPL-MCNC: 9.5 MG/DL (ref 8.4–10.2)
CHLORIDE SERPL-SCNC: 106 MMOL/L (ref 98–107)
CO2 SERPL-SCNC: 26 MMOL/L (ref 23–31)
CREAT SERPL-MCNC: 1.16 MG/DL (ref 0.55–1.02)
FERRITIN SERPL-MCNC: 154.37 NG/ML (ref 4.63–204)
FOLATE SERPL-MCNC: 11.6 NG/ML (ref 7–31.4)
GFR SERPLBLD CREATININE-BSD FMLA CKD-EPI: 51 MLS/MIN/1.73/M2
GLOBULIN SER-MCNC: 2.9 GM/DL (ref 2.4–3.5)
GLUCOSE SERPL-MCNC: 88 MG/DL (ref 82–115)
IRON SATN MFR SERPL: 24 % (ref 20–50)
IRON SERPL-MCNC: 56 UG/DL (ref 50–170)
POTASSIUM SERPL-SCNC: 4.4 MMOL/L (ref 3.5–5.1)
PROT SERPL-MCNC: 6.3 GM/DL (ref 5.8–7.6)
SODIUM SERPL-SCNC: 140 MMOL/L (ref 136–145)
TIBC SERPL-MCNC: 180 UG/DL (ref 70–310)
TIBC SERPL-MCNC: 236 UG/DL (ref 250–450)
TRANSFERRIN SERPL-MCNC: 211 MG/DL (ref 173–360)
VIT B12 SERPL-MCNC: >2000 PG/ML (ref 213–816)

## 2023-01-27 PROCEDURE — 82746 ASSAY OF FOLIC ACID SERUM: CPT | Performed by: INTERNAL MEDICINE

## 2023-01-27 PROCEDURE — 80053 COMPREHEN METABOLIC PANEL: CPT | Performed by: INTERNAL MEDICINE

## 2023-01-27 PROCEDURE — 83550 IRON BINDING TEST: CPT | Performed by: INTERNAL MEDICINE

## 2023-01-27 PROCEDURE — 82728 ASSAY OF FERRITIN: CPT | Performed by: INTERNAL MEDICINE

## 2023-01-27 PROCEDURE — 82607 VITAMIN B-12: CPT | Performed by: INTERNAL MEDICINE

## 2023-02-02 ENCOUNTER — CLINICAL SUPPORT (OUTPATIENT)
Dept: HEMATOLOGY/ONCOLOGY | Facility: CLINIC | Age: 68
End: 2023-02-02
Payer: MEDICARE

## 2023-02-02 ENCOUNTER — OFFICE VISIT (OUTPATIENT)
Dept: HEMATOLOGY/ONCOLOGY | Facility: CLINIC | Age: 68
End: 2023-02-02
Payer: MEDICARE

## 2023-02-02 DIAGNOSIS — D64.9 ANEMIA, UNSPECIFIED TYPE: Primary | ICD-10-CM

## 2023-02-02 DIAGNOSIS — D50.9 IRON DEFICIENCY ANEMIA, UNSPECIFIED IRON DEFICIENCY ANEMIA TYPE: ICD-10-CM

## 2023-02-02 PROCEDURE — 99213 PR OFFICE/OUTPT VISIT, EST, LEVL III, 20-29 MIN: ICD-10-PCS | Mod: 95,,, | Performed by: INTERNAL MEDICINE

## 2023-02-02 PROCEDURE — 99213 OFFICE O/P EST LOW 20 MIN: CPT | Mod: 95,,, | Performed by: INTERNAL MEDICINE

## 2023-02-02 NOTE — PROGRESS NOTES
PATIENT: Jenna Cuenca  MRN: [id number]  DATE: 2/2/2023     Established Patient - Audio Only Telehealth Visit     The patient location is: [facility]  Visit type: Virtual visit with audio only (telephone)  Total time spent with patient: 15 mins      The reason for the audio only service rather than synchronous audio and video virtual visit was related to technical difficulties or patient preference/necessity.     Each patient to whom I provide medical services by telemedicine is:  (1) informed of the relationship between the physician and patient and the respective role of any other health care provider with respect to management of the patient; and (2) notified that they may decline to receive medical services by telemedicine and may withdraw from such care at any time. Patient verbally consented to receive this service via voice-only telephone call.    This service was not originating from a related E/M service provided within the previous 7 days nor will  to an E/M service or procedure within the next 24 hours or my soonest available appointment.  Prevailing standard of care was able to be met in this audio-only visit.        Chief Complaint: Telemed visit from [facility]. No Complaints.     Hematologic History: Anemia        Subjective:   Interval History:   Ms. Cuenca is a 68 y.o. female who is seen today via telemdicine for anemia, referred by Dr Alvarado. She resides at the [facility]. She usually arrives via ambulance on stretcher with EMS. She states that she ambulates via wheelchair at the intermediate and fed by staff. She is in good spirits and communicates well. She initially had positive hemoccult test at NH. EGD was performed with normal result.  She was slated to undergo colonoscopy last month with Dr. Otonile Bridges but was later deferred due to her improvement in CBC and negative blood stool sample. She denies easy bruising or bleeding.     Doing well since last visit. No new  complaints.  Denies any blood in the urine or bowel movements.    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 No pertinent surgical history.    Family History: History reviewed. No pertinent family history.    Social History:  reports that she has never smoked. She has never used smokeless tobacco. She reports that she does not drink alcohol and does not use drugs.    Allergies:  Review of patient's allergies indicates:  No Known Allergies    Medications:  Current Outpatient Medications   Medication Sig Dispense Refill    calcium carbonate-vitamin D3 600 mg-10 mcg (400 unit) Cap Take 1 tablet by mouth Daily.      carvediloL (COREG) 12.5 MG tablet Take 6.25 mg by mouth 2 (two) times a day.      ferrous sulfate (FEOSOL) 325 mg (65 mg iron) Tab tablet Take 325 mg by mouth 2 (two) times daily.      vitamin B complex (B COMPLEX 1 ORAL) Take 1 tablet by mouth Daily.       No current facility-administered medications for this visit.       Review of Systems   Constitutional:  Positive for activity change.   HENT: Negative.     Eyes: Negative.    Respiratory: Negative.      Cardiovascular: Negative.    Gastrointestinal: Negative.    Endocrine: Negative.    Genitourinary: Negative.    Musculoskeletal:         Body contracture   Skin: Negative.    Allergic/Immunologic: Negative.    Neurological:  Positive for weakness.   Hematological: Negative.    Psychiatric/Behavioral: Negative.     All other systems reviewed and are negative.        Objective:      Vitals:   There were no vitals filed for this visit.  BMI: There is no height or weight on file to calculate BMI.    Physical Exam:  Deferred (Telemedicine)      LABS / IMAGING      Laboratory Data:  Lab Requisition on 01/27/2023   Component Date Value Ref Range Status    Iron Binding Capacity Unsaturated 01/27/2023 180  70 - 310 ug/dL Final    Iron Level 01/27/2023 56  50 - 170 ug/dL Final    Transferrin 01/27/2023 211  173 - 360 mg/dL Final    Iron Binding Capacity Total 01/27/2023 236 (L)  250 - 450 ug/dL Final    Iron Saturation 01/27/2023 24  20 - 50 % Final    Ferritin Level 01/27/2023 154.37  4.63 - 204.00 ng/mL Final    Sodium Level 01/27/2023 140  136 - 145 mmol/L Final    Potassium Level 01/27/2023 4.4  3.5 - 5.1 mmol/L Final    Chloride 01/27/2023 106  98 - 107 mmol/L Final    Carbon Dioxide 01/27/2023 26  23 - 31 mmol/L Final    Glucose Level 01/27/2023 88  82 - 115 mg/dL Final    Blood Urea Nitrogen 01/27/2023 40.0 (H)  9.8 - 20.1 mg/dL Final    Creatinine 01/27/2023 1.16 (H)  0.55 - 1.02 mg/dL Final    Calcium Level Total 01/27/2023 9.5  8.4 - 10.2 mg/dL Final    Protein Total 01/27/2023 6.3  5.8 - 7.6 gm/dL Final    Albumin Level 01/27/2023 3.4  3.4 - 4.8 g/dL Final    Globulin 01/27/2023 2.9  2.4 - 3.5 gm/dL Final    Albumin/Globulin Ratio 01/27/2023 1.2  1.1 - 2.0 ratio Final    Bilirubin Total 01/27/2023 0.3  <=1.5 mg/dL Final    Alkaline Phosphatase 01/27/2023 63  40 - 150 unit/L Final    Alanine Aminotransferase 01/27/2023 12  0 - 55 unit/L Final    Aspartate Aminotransferase 01/27/2023 17  5 - 34 unit/L Final    eGFR  "01/27/2023 51  mls/min/1.73/m2 Final    Vitamin B12 Level 01/27/2023 >2,000 (H)  213 - 816 pg/mL Final    Folate Level 01/27/2023 11.6  7.0 - 31.4 ng/mL Final     6/22/22 labs ordered at her initial evaluation CBC, CMP, Iron, Ferritin, Folate, B12, , CRP, EPO, Haptoglobin, Hgb elect,CEA  all normal.   Peripheral smear:  "hypochromic normocytic anemia" , Retic 0.77,   LDH slightly elevated at 291.    Imaging:   Assessment:     1. Iron deficiency Anemia: Stable H/H   - Continue Ferrous sulfate 325 mg po daily   - No evidence of  nutrient deficiency, hemolysis, or thalassemia.    2. Anemia of CRI: improved H/H  Creatinine is 1.0 erythropoietin level is 7.9.   Might consider to start Procrit/Aranesp later if Hb less than 11 with Ferritin >100.    2. CRI: F/U with Nephrologist/PCP  - S/P  Left ureteroscopic stone extraction of a distal left ureteral calculus and ureteroscopy with laser lithotripsy of a more proximal ureteral calculus and indwelling stent March 2022. Dr Alvarado states that she does not need to follow up with Nephrologist due to normal creatine level.  She will continue to follow up with PCP as PRN          Plan:  - labs reviewed, noted to have stable hemoglobin.    - continue with ferrous sulfate 325 mg daily instead of b.i.d.  - labs on follow-up:  CBC, CMP, ferritin, iron, retic count  - return to clinic in 3 months with MD/labs                       The patient was seen, interviewed and examined. Pertinent lab and radiology studies were reviewed. Pt instructed to call should develop concerning signs/symptoms or have further questions.       Portions of the record may have been created with voice recognition software. Occasional wrong-word or sound-a-like substitutions may have occurred due to the inherent limitations of voice recognition software. Read the chart carefully and recognize, using context, where substitutions have occurred.    Yo Wallace MD  Hematology / Oncology         "

## 2023-02-02 NOTE — PROGRESS NOTES
PATIENT: Jenna Cuenca  MRN: [id number]  DATE: 2/2/2023     Established Patient - Audio Only Telehealth Visit     The patient location is: [facility]  Visit type: Virtual visit with audio only (telephone)  Total time spent with patient: 15 mins      The reason for the audio only service rather than synchronous audio and video virtual visit was related to technical difficulties or patient preference/necessity.     Each patient to whom I provide medical services by telemedicine is:  (1) informed of the relationship between the physician and patient and the respective role of any other health care provider with respect to management of the patient; and (2) notified that they may decline to receive medical services by telemedicine and may withdraw from such care at any time. Patient verbally consented to receive this service via voice-only telephone call.    This service was not originating from a related E/M service provided within the previous 7 days nor will  to an E/M service or procedure within the next 24 hours or my soonest available appointment.  Prevailing standard of care was able to be met in this audio-only visit.        Chief Complaint: Telemed visit from [facility]. No Complaints.     Hematologic History: Anemia        Subjective:   Interval History:   Ms. Cuenca is a 68 y.o. female who is seen today via telemdicine for anemia, referred by Dr Alvarado. She resides at the [facility]. She usually arrives via ambulance on stretcher with EMS. She states that she ambulates via wheelchair at the long term and fed by staff. She is in good spirits and communicates well. She initially had positive hemoccult test at NH. EGD was performed with normal result.  She was slated to undergo colonoscopy last month with Dr. Otoniel Bridges but was later deferred due to her improvement in CBC and negative blood stool sample. She denies easy bruising or bleeding.     Doing well since last visit. No new  complaints.  Denies any blood in the urine or bowel movements.    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    Family History: No family history on file.    Social History:  reports that she has never smoked. She has never used smokeless tobacco. She reports that she does not drink alcohol and does not use drugs.    Allergies:  Review of patient's allergies indicates:  No Known Allergies    Medications:  Current Outpatient Medications   Medication Sig Dispense Refill    calcium carbonate-vitamin D3 600 mg-10 mcg (400 unit) Cap Take 1 tablet by mouth Daily.      carvediloL (COREG) 12.5 MG tablet Take 6.25 mg by mouth 2 (two) times a day.      ferrous sulfate (FEOSOL) 325 mg (65 mg iron) Tab tablet Take 325 mg by mouth 2 (two) times daily.      vitamin B complex (B COMPLEX 1 ORAL) Take 1 tablet by mouth Daily.       No current facility-administered medications for this visit.       Review of Systems   Constitutional:  Positive for activity change.   HENT: Negative.     Eyes: Negative.    Respiratory: Negative.     Cardiovascular: Negative.     Gastrointestinal: Negative.    Endocrine: Negative.    Genitourinary: Negative.    Musculoskeletal:         Body contracture   Skin: Negative.    Allergic/Immunologic: Negative.    Neurological:  Positive for weakness.   Hematological: Negative.    Psychiatric/Behavioral: Negative.     All other systems reviewed and are negative.        Objective:      Vitals:   There were no vitals filed for this visit.  BMI: There is no height or weight on file to calculate BMI.    Physical Exam:  Deferred (Telemedicine)      LABS / IMAGING      Laboratory Data:  Lab Requisition on 01/27/2023   Component Date Value Ref Range Status    Iron Binding Capacity Unsaturated 01/27/2023 180  70 - 310 ug/dL Final    Iron Level 01/27/2023 56  50 - 170 ug/dL Final    Transferrin 01/27/2023 211  173 - 360 mg/dL Final    Iron Binding Capacity Total 01/27/2023 236 (L)  250 - 450 ug/dL Final    Iron Saturation 01/27/2023 24  20 - 50 % Final    Ferritin Level 01/27/2023 154.37  4.63 - 204.00 ng/mL Final    Sodium Level 01/27/2023 140  136 - 145 mmol/L Final    Potassium Level 01/27/2023 4.4  3.5 - 5.1 mmol/L Final    Chloride 01/27/2023 106  98 - 107 mmol/L Final    Carbon Dioxide 01/27/2023 26  23 - 31 mmol/L Final    Glucose Level 01/27/2023 88  82 - 115 mg/dL Final    Blood Urea Nitrogen 01/27/2023 40.0 (H)  9.8 - 20.1 mg/dL Final    Creatinine 01/27/2023 1.16 (H)  0.55 - 1.02 mg/dL Final    Calcium Level Total 01/27/2023 9.5  8.4 - 10.2 mg/dL Final    Protein Total 01/27/2023 6.3  5.8 - 7.6 gm/dL Final    Albumin Level 01/27/2023 3.4  3.4 - 4.8 g/dL Final    Globulin 01/27/2023 2.9  2.4 - 3.5 gm/dL Final    Albumin/Globulin Ratio 01/27/2023 1.2  1.1 - 2.0 ratio Final    Bilirubin Total 01/27/2023 0.3  <=1.5 mg/dL Final    Alkaline Phosphatase 01/27/2023 63  40 - 150 unit/L Final    Alanine Aminotransferase 01/27/2023 12  0 - 55 unit/L Final    Aspartate Aminotransferase 01/27/2023 17  5 - 34 unit/L Final    eGFR 01/27/2023 51   "mls/min/1.73/m2 Final    Vitamin B12 Level 01/27/2023 >2,000 (H)  213 - 816 pg/mL Final    Folate Level 01/27/2023 11.6  7.0 - 31.4 ng/mL Final     6/22/22 labs ordered at her initial evaluation CBC, CMP, Iron, Ferritin, Folate, B12, , CRP, EPO, Haptoglobin, Hgb elect,CEA  all normal.   Peripheral smear:  "hypochromic normocytic anemia" , Retic 0.77,   LDH slightly elevated at 291.    Imaging:   Assessment:     1. Iron deficiency Anemia: Stable H/H   - Continue Ferrous sulfate 325 mg po daily   - No evidence of  nutrient deficiency, hemolysis, or thalassemia.    2. Anemia of CRI: improved H/H  Creatinine is 1.0 erythropoietin level is 7.9.   Might consider to start Procrit/Aranesp later if Hb less than 11 with Ferritin >100.    2. CRI: F/U with Nephrologist/PCP  - S/P  Left ureteroscopic stone extraction of a distal left ureteral calculus and ureteroscopy with laser lithotripsy of a more proximal ureteral calculus and indwelling stent March 2022. Dr Alvarado states that she does not need to follow up with Nephrologist due to normal creatine level.  She will continue to follow up with PCP as PRN          Plan:  - labs reviewed, noted to have stable hemoglobin.    - continue with ferrous sulfate 325 mg daily instead of b.i.d.  - labs on follow-up:  CBC, CMP, ferritin, iron, retic count  - return to clinic in 3 months with MD/labs                       The patient was seen, interviewed and examined. Pertinent lab and radiology studies were reviewed. Pt instructed to call should develop concerning signs/symptoms or have further questions.       Portions of the record may have been created with voice recognition software. Occasional wrong-word or sound-a-like substitutions may have occurred due to the inherent limitations of voice recognition software. Read the chart carefully and recognize, using context, where substitutions have occurred.    Yo Wallace MD  Hematology / Oncology           "

## 2023-04-10 PROCEDURE — 82270 OCCULT BLOOD FECES: CPT | Performed by: FAMILY MEDICINE

## 2023-04-11 ENCOUNTER — LAB REQUISITION (OUTPATIENT)
Dept: LAB | Facility: HOSPITAL | Age: 68
End: 2023-04-11
Payer: MEDICARE

## 2023-04-11 DIAGNOSIS — D64.9 ANEMIA, UNSPECIFIED: ICD-10-CM

## 2023-04-11 LAB — HEMOCCULT SP1 STL QL: NEGATIVE

## 2023-04-12 ENCOUNTER — LAB REQUISITION (OUTPATIENT)
Dept: LAB | Facility: HOSPITAL | Age: 68
End: 2023-04-12
Payer: MEDICARE

## 2023-04-12 DIAGNOSIS — D64.9 ANEMIA, UNSPECIFIED: ICD-10-CM

## 2023-04-12 LAB — HEMOCCULT SP1 STL QL: POSITIVE

## 2023-04-12 PROCEDURE — 82270 OCCULT BLOOD FECES: CPT | Performed by: FAMILY MEDICINE

## 2023-04-13 ENCOUNTER — LAB REQUISITION (OUTPATIENT)
Dept: LAB | Facility: HOSPITAL | Age: 68
End: 2023-04-13
Payer: MEDICARE

## 2023-04-13 DIAGNOSIS — N17.9 ACUTE KIDNEY FAILURE, UNSPECIFIED: ICD-10-CM

## 2023-04-13 LAB
ALBUMIN SERPL-MCNC: 3.5 G/DL (ref 3.4–4.8)
ALBUMIN/GLOB SERPL: 1.3 RATIO (ref 1.1–2)
ALP SERPL-CCNC: 59 UNIT/L (ref 40–150)
ALT SERPL-CCNC: 16 UNIT/L (ref 0–55)
AST SERPL-CCNC: 16 UNIT/L (ref 5–34)
BASOPHILS # BLD AUTO: 0.04 X10(3)/MCL (ref 0–0.2)
BASOPHILS NFR BLD AUTO: 0.6 %
BILIRUBIN DIRECT+TOT PNL SERPL-MCNC: 0.3 MG/DL
BUN SERPL-MCNC: 37.6 MG/DL (ref 9.8–20.1)
CALCIUM SERPL-MCNC: 10 MG/DL (ref 8.4–10.2)
CHLORIDE SERPL-SCNC: 105 MMOL/L (ref 98–107)
CO2 SERPL-SCNC: 27 MMOL/L (ref 23–31)
CREAT SERPL-MCNC: 1.07 MG/DL (ref 0.55–1.02)
EOSINOPHIL # BLD AUTO: 0.61 X10(3)/MCL (ref 0–0.9)
EOSINOPHIL NFR BLD AUTO: 8.9 %
ERYTHROCYTE [DISTWIDTH] IN BLOOD BY AUTOMATED COUNT: 13.8 % (ref 11.5–17)
GFR SERPLBLD CREATININE-BSD FMLA CKD-EPI: 57 MLS/MIN/1.73/M2
GLOBULIN SER-MCNC: 2.8 GM/DL (ref 2.4–3.5)
GLUCOSE SERPL-MCNC: 73 MG/DL (ref 82–115)
HCT VFR BLD AUTO: 38.5 % (ref 37–47)
HGB BLD-MCNC: 12 G/DL (ref 12–16)
IMM GRANULOCYTES # BLD AUTO: 0.01 X10(3)/MCL (ref 0–0.04)
IMM GRANULOCYTES NFR BLD AUTO: 0.1 %
LYMPHOCYTES # BLD AUTO: 2.25 X10(3)/MCL (ref 0.6–4.6)
LYMPHOCYTES NFR BLD AUTO: 32.9 %
MCH RBC QN AUTO: 25.8 PG (ref 27–31)
MCHC RBC AUTO-ENTMCNC: 31.2 G/DL (ref 33–36)
MCV RBC AUTO: 82.8 FL (ref 80–94)
MONOCYTES # BLD AUTO: 0.53 X10(3)/MCL (ref 0.1–1.3)
MONOCYTES NFR BLD AUTO: 7.7 %
NEUTROPHILS # BLD AUTO: 3.4 X10(3)/MCL (ref 2.1–9.2)
NEUTROPHILS NFR BLD AUTO: 49.8 %
NRBC BLD AUTO-RTO: 0 %
PLATELET # BLD AUTO: 177 X10(3)/MCL (ref 130–400)
PMV BLD AUTO: 12.3 FL (ref 7.4–10.4)
POTASSIUM SERPL-SCNC: 4.6 MMOL/L (ref 3.5–5.1)
PROT SERPL-MCNC: 6.3 GM/DL (ref 5.8–7.6)
RBC # BLD AUTO: 4.65 X10(6)/MCL (ref 4.2–5.4)
SODIUM SERPL-SCNC: 143 MMOL/L (ref 136–145)
WBC # SPEC AUTO: 6.8 X10(3)/MCL (ref 4.5–11.5)

## 2023-04-13 PROCEDURE — 85025 COMPLETE CBC W/AUTO DIFF WBC: CPT | Performed by: FAMILY MEDICINE

## 2023-04-13 PROCEDURE — 80053 COMPREHEN METABOLIC PANEL: CPT | Performed by: FAMILY MEDICINE

## 2023-04-20 ENCOUNTER — LAB REQUISITION (OUTPATIENT)
Dept: LAB | Facility: HOSPITAL | Age: 68
End: 2023-04-20
Payer: MEDICARE

## 2023-04-20 DIAGNOSIS — R19.5 OTHER FECAL ABNORMALITIES: ICD-10-CM

## 2023-04-20 LAB — HEMOCCULT SP1 STL QL: NEGATIVE

## 2023-04-20 PROCEDURE — 82270 OCCULT BLOOD FECES: CPT | Performed by: FAMILY MEDICINE

## 2023-07-13 ENCOUNTER — LAB REQUISITION (OUTPATIENT)
Dept: LAB | Facility: HOSPITAL | Age: 68
End: 2023-07-13
Payer: MEDICARE

## 2023-07-13 DIAGNOSIS — N17.9 ACUTE KIDNEY FAILURE, UNSPECIFIED: ICD-10-CM

## 2023-07-13 DIAGNOSIS — E87.5 HYPERKALEMIA: ICD-10-CM

## 2023-07-13 LAB
ANION GAP SERPL CALC-SCNC: 11 MEQ/L
BASOPHILS # BLD AUTO: 0.02 X10(3)/MCL
BASOPHILS NFR BLD AUTO: 0.3 %
BUN SERPL-MCNC: 29 MG/DL (ref 9.8–20.1)
CALCIUM SERPL-MCNC: 9 MG/DL (ref 8.4–10.2)
CHLORIDE SERPL-SCNC: 106 MMOL/L (ref 98–107)
CO2 SERPL-SCNC: 26 MMOL/L (ref 23–31)
CREAT SERPL-MCNC: 0.88 MG/DL (ref 0.55–1.02)
CREAT/UREA NIT SERPL: 33
EOSINOPHIL # BLD AUTO: 0.4 X10(3)/MCL (ref 0–0.9)
EOSINOPHIL NFR BLD AUTO: 5.3 %
ERYTHROCYTE [DISTWIDTH] IN BLOOD BY AUTOMATED COUNT: 13.6 % (ref 11.5–17)
GFR SERPLBLD CREATININE-BSD FMLA CKD-EPI: >60 MLS/MIN/1.73/M2
GLUCOSE SERPL-MCNC: 103 MG/DL (ref 82–115)
HCT VFR BLD AUTO: 38.8 % (ref 37–47)
HGB BLD-MCNC: 12.1 G/DL (ref 12–16)
IMM GRANULOCYTES # BLD AUTO: 0.02 X10(3)/MCL (ref 0–0.04)
IMM GRANULOCYTES NFR BLD AUTO: 0.3 %
LYMPHOCYTES # BLD AUTO: 1.11 X10(3)/MCL (ref 0.6–4.6)
LYMPHOCYTES NFR BLD AUTO: 14.8 %
MCH RBC QN AUTO: 25.7 PG (ref 27–31)
MCHC RBC AUTO-ENTMCNC: 31.2 G/DL (ref 33–36)
MCV RBC AUTO: 82.6 FL (ref 80–94)
MONOCYTES # BLD AUTO: 0.64 X10(3)/MCL (ref 0.1–1.3)
MONOCYTES NFR BLD AUTO: 8.5 %
NEUTROPHILS # BLD AUTO: 5.31 X10(3)/MCL (ref 2.1–9.2)
NEUTROPHILS NFR BLD AUTO: 70.8 %
NRBC BLD AUTO-RTO: 0 %
PLATELET # BLD AUTO: 68 X10(3)/MCL (ref 130–400)
PLATELETS.RETICULATED NFR BLD AUTO: 11.4 % (ref 0.9–11.2)
PMV BLD AUTO: 11.5 FL (ref 7.4–10.4)
POTASSIUM SERPL-SCNC: 3.7 MMOL/L (ref 3.5–5.1)
RBC # BLD AUTO: 4.7 X10(6)/MCL (ref 4.2–5.4)
SODIUM SERPL-SCNC: 143 MMOL/L (ref 136–145)
TSH SERPL-ACNC: 2.34 UIU/ML (ref 0.35–4.94)
WBC # SPEC AUTO: 7.5 X10(3)/MCL (ref 4.5–11.5)

## 2023-07-13 PROCEDURE — 84443 ASSAY THYROID STIM HORMONE: CPT | Performed by: FAMILY MEDICINE

## 2023-07-13 PROCEDURE — 85025 COMPLETE CBC W/AUTO DIFF WBC: CPT | Performed by: FAMILY MEDICINE

## 2023-07-13 PROCEDURE — 80048 BASIC METABOLIC PNL TOTAL CA: CPT | Performed by: FAMILY MEDICINE

## 2023-07-14 ENCOUNTER — LAB REQUISITION (OUTPATIENT)
Dept: LAB | Facility: HOSPITAL | Age: 68
End: 2023-07-14
Payer: MEDICARE

## 2023-07-14 DIAGNOSIS — I10 ESSENTIAL (PRIMARY) HYPERTENSION: ICD-10-CM

## 2023-07-14 LAB
BASOPHILS # BLD AUTO: 0.02 X10(3)/MCL
BASOPHILS NFR BLD AUTO: 0.4 %
EOSINOPHIL # BLD AUTO: 0.35 X10(3)/MCL (ref 0–0.9)
EOSINOPHIL NFR BLD AUTO: 6.3 %
ERYTHROCYTE [DISTWIDTH] IN BLOOD BY AUTOMATED COUNT: 13.3 % (ref 11.5–17)
HCT VFR BLD AUTO: 38.9 % (ref 37–47)
HGB BLD-MCNC: 12.3 G/DL (ref 12–16)
IMM GRANULOCYTES # BLD AUTO: 0.01 X10(3)/MCL (ref 0–0.04)
IMM GRANULOCYTES NFR BLD AUTO: 0.2 %
LYMPHOCYTES # BLD AUTO: 1.5 X10(3)/MCL (ref 0.6–4.6)
LYMPHOCYTES NFR BLD AUTO: 27.2 %
MCH RBC QN AUTO: 26 PG (ref 27–31)
MCHC RBC AUTO-ENTMCNC: 31.6 G/DL (ref 33–36)
MCV RBC AUTO: 82.2 FL (ref 80–94)
MONOCYTES # BLD AUTO: 0.45 X10(3)/MCL (ref 0.1–1.3)
MONOCYTES NFR BLD AUTO: 8.2 %
NEUTROPHILS # BLD AUTO: 3.19 X10(3)/MCL (ref 2.1–9.2)
NEUTROPHILS NFR BLD AUTO: 57.7 %
NRBC BLD AUTO-RTO: 0 %
PLATELET # BLD AUTO: 122 X10(3)/MCL (ref 130–400)
PMV BLD AUTO: 12.4 FL (ref 7.4–10.4)
RBC # BLD AUTO: 4.73 X10(6)/MCL (ref 4.2–5.4)
WBC # SPEC AUTO: 5.52 X10(3)/MCL (ref 4.5–11.5)

## 2023-07-14 PROCEDURE — 85025 COMPLETE CBC W/AUTO DIFF WBC: CPT | Performed by: FAMILY MEDICINE

## 2023-07-19 LAB — VIEW PATHOLOGY REPORT (RELIAPATH): NORMAL

## 2023-10-16 ENCOUNTER — LAB REQUISITION (OUTPATIENT)
Dept: LAB | Facility: HOSPITAL | Age: 68
End: 2023-10-16
Payer: MEDICARE

## 2023-10-16 DIAGNOSIS — I10 ESSENTIAL (PRIMARY) HYPERTENSION: ICD-10-CM

## 2023-10-16 LAB
ANION GAP SERPL CALC-SCNC: 8 MEQ/L
BASOPHILS # BLD AUTO: 0.04 X10(3)/MCL
BASOPHILS NFR BLD AUTO: 0.7 %
BUN SERPL-MCNC: 36.3 MG/DL (ref 9.8–20.1)
CALCIUM SERPL-MCNC: 8.9 MG/DL (ref 8.4–10.2)
CHLORIDE SERPL-SCNC: 110 MMOL/L (ref 98–107)
CO2 SERPL-SCNC: 28 MMOL/L (ref 23–31)
CREAT SERPL-MCNC: 0.96 MG/DL (ref 0.55–1.02)
CREAT/UREA NIT SERPL: 38
EOSINOPHIL # BLD AUTO: 0.39 X10(3)/MCL (ref 0–0.9)
EOSINOPHIL NFR BLD AUTO: 6.6 %
ERYTHROCYTE [DISTWIDTH] IN BLOOD BY AUTOMATED COUNT: 13.6 % (ref 11.5–17)
GFR SERPLBLD CREATININE-BSD FMLA CKD-EPI: >60 MLS/MIN/1.73/M2
GLUCOSE SERPL-MCNC: 88 MG/DL (ref 82–115)
HCT VFR BLD AUTO: 36.3 % (ref 37–47)
HGB BLD-MCNC: 11.5 G/DL (ref 12–16)
IMM GRANULOCYTES # BLD AUTO: 0.01 X10(3)/MCL (ref 0–0.04)
IMM GRANULOCYTES NFR BLD AUTO: 0.2 %
LYMPHOCYTES # BLD AUTO: 1.8 X10(3)/MCL (ref 0.6–4.6)
LYMPHOCYTES NFR BLD AUTO: 30.5 %
MCH RBC QN AUTO: 26 PG (ref 27–31)
MCHC RBC AUTO-ENTMCNC: 31.7 G/DL (ref 33–36)
MCV RBC AUTO: 82.1 FL (ref 80–94)
MONOCYTES # BLD AUTO: 0.49 X10(3)/MCL (ref 0.1–1.3)
MONOCYTES NFR BLD AUTO: 8.3 %
NEUTROPHILS # BLD AUTO: 3.17 X10(3)/MCL (ref 2.1–9.2)
NEUTROPHILS NFR BLD AUTO: 53.7 %
NRBC BLD AUTO-RTO: 0 %
PLATELET # BLD AUTO: 155 X10(3)/MCL (ref 130–400)
PMV BLD AUTO: 12.2 FL (ref 7.4–10.4)
POTASSIUM SERPL-SCNC: 4.5 MMOL/L (ref 3.5–5.1)
RBC # BLD AUTO: 4.42 X10(6)/MCL (ref 4.2–5.4)
SODIUM SERPL-SCNC: 146 MMOL/L (ref 136–145)
WBC # SPEC AUTO: 5.9 X10(3)/MCL (ref 4.5–11.5)

## 2023-10-16 PROCEDURE — 80048 BASIC METABOLIC PNL TOTAL CA: CPT | Performed by: FAMILY MEDICINE

## 2023-10-16 PROCEDURE — 85025 COMPLETE CBC W/AUTO DIFF WBC: CPT | Performed by: FAMILY MEDICINE

## 2024-01-04 ENCOUNTER — LAB REQUISITION (OUTPATIENT)
Dept: LAB | Facility: HOSPITAL | Age: 69
End: 2024-01-04
Payer: MEDICARE

## 2024-01-04 DIAGNOSIS — E87.5 HYPERKALEMIA: ICD-10-CM

## 2024-01-04 DIAGNOSIS — D64.9 ANEMIA, UNSPECIFIED: ICD-10-CM

## 2024-01-04 LAB
ANION GAP SERPL CALC-SCNC: 7 MEQ/L
BASOPHILS # BLD AUTO: 0.03 X10(3)/MCL
BASOPHILS NFR BLD AUTO: 0.5 %
BUN SERPL-MCNC: 29.4 MG/DL (ref 9.8–20.1)
CALCIUM SERPL-MCNC: 9.5 MG/DL (ref 8.4–10.2)
CHLORIDE SERPL-SCNC: 108 MMOL/L (ref 98–107)
CO2 SERPL-SCNC: 27 MMOL/L (ref 23–31)
CREAT SERPL-MCNC: 1.07 MG/DL (ref 0.55–1.02)
CREAT/UREA NIT SERPL: 27
EOSINOPHIL # BLD AUTO: 0.38 X10(3)/MCL (ref 0–0.9)
EOSINOPHIL NFR BLD AUTO: 5.9 %
ERYTHROCYTE [DISTWIDTH] IN BLOOD BY AUTOMATED COUNT: 13.7 % (ref 11.5–17)
GFR SERPLBLD CREATININE-BSD FMLA CKD-EPI: 57 MLS/MIN/1.73/M2
GLUCOSE SERPL-MCNC: 90 MG/DL (ref 82–115)
HCT VFR BLD AUTO: 41 % (ref 37–47)
HGB BLD-MCNC: 13 G/DL (ref 12–16)
IMM GRANULOCYTES # BLD AUTO: 0.01 X10(3)/MCL (ref 0–0.04)
IMM GRANULOCYTES NFR BLD AUTO: 0.2 %
LYMPHOCYTES # BLD AUTO: 1.62 X10(3)/MCL (ref 0.6–4.6)
LYMPHOCYTES NFR BLD AUTO: 25.2 %
MCH RBC QN AUTO: 26.2 PG (ref 27–31)
MCHC RBC AUTO-ENTMCNC: 31.7 G/DL (ref 33–36)
MCV RBC AUTO: 82.5 FL (ref 80–94)
MONOCYTES # BLD AUTO: 0.44 X10(3)/MCL (ref 0.1–1.3)
MONOCYTES NFR BLD AUTO: 6.8 %
NEUTROPHILS # BLD AUTO: 3.96 X10(3)/MCL (ref 2.1–9.2)
NEUTROPHILS NFR BLD AUTO: 61.4 %
NRBC BLD AUTO-RTO: 0 %
PLATELET # BLD AUTO: 188 X10(3)/MCL (ref 130–400)
PMV BLD AUTO: 12.5 FL (ref 7.4–10.4)
POTASSIUM SERPL-SCNC: 4.3 MMOL/L (ref 3.5–5.1)
RBC # BLD AUTO: 4.97 X10(6)/MCL (ref 4.2–5.4)
SODIUM SERPL-SCNC: 142 MMOL/L (ref 136–145)
WBC # SPEC AUTO: 6.44 X10(3)/MCL (ref 4.5–11.5)

## 2024-01-04 PROCEDURE — 85025 COMPLETE CBC W/AUTO DIFF WBC: CPT | Performed by: FAMILY MEDICINE

## 2024-01-04 PROCEDURE — 80048 BASIC METABOLIC PNL TOTAL CA: CPT | Performed by: FAMILY MEDICINE

## 2024-04-05 ENCOUNTER — LAB REQUISITION (OUTPATIENT)
Dept: LAB | Facility: HOSPITAL | Age: 69
End: 2024-04-05
Payer: MEDICARE

## 2024-04-05 DIAGNOSIS — D64.9 ANEMIA, UNSPECIFIED: ICD-10-CM

## 2024-04-05 LAB — HEMOCCULT SP1 STL QL: NEGATIVE

## 2024-04-05 PROCEDURE — 82270 OCCULT BLOOD FECES: CPT | Performed by: FAMILY MEDICINE

## 2024-04-08 ENCOUNTER — LAB REQUISITION (OUTPATIENT)
Dept: LAB | Facility: HOSPITAL | Age: 69
End: 2024-04-08
Payer: MEDICARE

## 2024-04-08 DIAGNOSIS — I10 ESSENTIAL (PRIMARY) HYPERTENSION: ICD-10-CM

## 2024-04-08 DIAGNOSIS — D64.9 ANEMIA, UNSPECIFIED: ICD-10-CM

## 2024-04-08 LAB
ANION GAP SERPL CALC-SCNC: 7 MEQ/L
BASOPHILS # BLD AUTO: 0.04 X10(3)/MCL
BASOPHILS NFR BLD AUTO: 0.8 %
BUN SERPL-MCNC: 29.3 MG/DL (ref 9.8–20.1)
CALCIUM SERPL-MCNC: 9.1 MG/DL (ref 8.4–10.2)
CHLORIDE SERPL-SCNC: 108 MMOL/L (ref 98–107)
CO2 SERPL-SCNC: 28 MMOL/L (ref 23–31)
CREAT SERPL-MCNC: 0.84 MG/DL (ref 0.55–1.02)
CREAT/UREA NIT SERPL: 35
EOSINOPHIL # BLD AUTO: 0.43 X10(3)/MCL (ref 0–0.9)
EOSINOPHIL NFR BLD AUTO: 8.2 %
ERYTHROCYTE [DISTWIDTH] IN BLOOD BY AUTOMATED COUNT: 13.7 % (ref 11.5–17)
GFR SERPLBLD CREATININE-BSD FMLA CKD-EPI: >60 MLS/MIN/1.73/M2
GLUCOSE SERPL-MCNC: 90 MG/DL (ref 82–115)
HCT VFR BLD AUTO: 38.3 % (ref 37–47)
HGB BLD-MCNC: 11.8 G/DL (ref 12–16)
IMM GRANULOCYTES # BLD AUTO: 0.01 X10(3)/MCL (ref 0–0.04)
IMM GRANULOCYTES NFR BLD AUTO: 0.2 %
LYMPHOCYTES # BLD AUTO: 1.78 X10(3)/MCL (ref 0.6–4.6)
LYMPHOCYTES NFR BLD AUTO: 33.8 %
MCH RBC QN AUTO: 25 PG (ref 27–31)
MCHC RBC AUTO-ENTMCNC: 30.8 G/DL (ref 33–36)
MCV RBC AUTO: 81.1 FL (ref 80–94)
MONOCYTES # BLD AUTO: 0.7 X10(3)/MCL (ref 0.1–1.3)
MONOCYTES NFR BLD AUTO: 13.3 %
NEUTROPHILS # BLD AUTO: 2.31 X10(3)/MCL (ref 2.1–9.2)
NEUTROPHILS NFR BLD AUTO: 43.7 %
NRBC BLD AUTO-RTO: 0 %
PLATELET # BLD AUTO: 131 X10(3)/MCL (ref 130–400)
PMV BLD AUTO: 12.2 FL (ref 7.4–10.4)
POTASSIUM SERPL-SCNC: 4.3 MMOL/L (ref 3.5–5.1)
RBC # BLD AUTO: 4.72 X10(6)/MCL (ref 4.2–5.4)
SODIUM SERPL-SCNC: 143 MMOL/L (ref 136–145)
WBC # SPEC AUTO: 5.27 X10(3)/MCL (ref 4.5–11.5)

## 2024-04-08 PROCEDURE — 80048 BASIC METABOLIC PNL TOTAL CA: CPT | Performed by: FAMILY MEDICINE

## 2024-04-08 PROCEDURE — 85025 COMPLETE CBC W/AUTO DIFF WBC: CPT | Performed by: FAMILY MEDICINE

## 2024-04-09 PROCEDURE — 82270 OCCULT BLOOD FECES: CPT | Performed by: FAMILY MEDICINE

## 2024-04-10 ENCOUNTER — LAB REQUISITION (OUTPATIENT)
Dept: LAB | Facility: HOSPITAL | Age: 69
End: 2024-04-10
Payer: MEDICARE

## 2024-04-10 DIAGNOSIS — D64.9 ANEMIA, UNSPECIFIED: ICD-10-CM

## 2024-04-10 DIAGNOSIS — R19.5 OTHER FECAL ABNORMALITIES: ICD-10-CM

## 2024-04-10 LAB — HEMOCCULT SP1 STL QL: NEGATIVE

## 2024-04-13 PROCEDURE — 82270 OCCULT BLOOD FECES: CPT | Performed by: FAMILY MEDICINE

## 2024-04-15 ENCOUNTER — LAB REQUISITION (OUTPATIENT)
Dept: LAB | Facility: HOSPITAL | Age: 69
End: 2024-04-15
Payer: MEDICARE

## 2024-04-15 DIAGNOSIS — D64.9 ANEMIA, UNSPECIFIED: ICD-10-CM

## 2024-04-15 LAB — HEMOCCULT SP1 STL QL: NEGATIVE

## 2024-08-28 DIAGNOSIS — Z12.31 ENCOUNTER FOR SCREENING MAMMOGRAM FOR BREAST CANCER: Primary | ICD-10-CM

## 2024-09-10 ENCOUNTER — HOSPITAL ENCOUNTER (OUTPATIENT)
Dept: RADIOLOGY | Facility: HOSPITAL | Age: 69
Discharge: HOME | End: 2024-09-10
Attending: SURGERY
Payer: MEDICARE

## 2024-09-10 DIAGNOSIS — Z12.31 ENCOUNTER FOR SCREENING MAMMOGRAM FOR BREAST CANCER: ICD-10-CM

## 2024-10-25 DIAGNOSIS — Z12.31 ENCOUNTER FOR SCREENING MAMMOGRAM FOR MALIGNANT NEOPLASM OF BREAST: Primary | ICD-10-CM

## 2024-11-15 ENCOUNTER — HOSPITAL ENCOUNTER (OUTPATIENT)
Dept: RADIOLOGY | Facility: HOSPITAL | Age: 69
Discharge: HOME | End: 2024-11-15
Attending: SURGERY
Payer: MEDICARE

## 2024-11-15 DIAGNOSIS — Z12.31 ENCOUNTER FOR SCREENING MAMMOGRAM FOR MALIGNANT NEOPLASM OF BREAST: ICD-10-CM

## 2025-08-22 DIAGNOSIS — Z12.31 ENCOUNTER FOR SCREENING MAMMOGRAM FOR MALIGNANT NEOPLASM OF BREAST: Primary | ICD-10-CM
